# Patient Record
Sex: MALE | Race: BLACK OR AFRICAN AMERICAN | Employment: FULL TIME | URBAN - METROPOLITAN AREA
[De-identification: names, ages, dates, MRNs, and addresses within clinical notes are randomized per-mention and may not be internally consistent; named-entity substitution may affect disease eponyms.]

---

## 2017-02-17 ENCOUNTER — HOSPITAL ENCOUNTER (EMERGENCY)
Facility: CLINIC | Age: 42
Discharge: HOME | End: 2017-02-17
Attending: EMERGENCY MEDICINE | Admitting: EMERGENCY MEDICINE
Payer: MEDICAID

## 2017-02-17 ENCOUNTER — APPOINTMENT (OUTPATIENT)
Dept: GENERAL RADIOLOGY | Facility: CLINIC | Age: 42
End: 2017-02-17
Attending: EMERGENCY MEDICINE
Payer: MEDICAID

## 2017-02-17 VITALS
TEMPERATURE: 99.2 F | SYSTOLIC BLOOD PRESSURE: 100 MMHG | OXYGEN SATURATION: 96 % | RESPIRATION RATE: 16 BRPM | DIASTOLIC BLOOD PRESSURE: 62 MMHG | HEART RATE: 78 BPM

## 2017-02-17 DIAGNOSIS — G40.909 NONINTRACTABLE EPILEPSY WITHOUT STATUS EPILEPTICUS, UNSPECIFIED EPILEPSY TYPE (H): ICD-10-CM

## 2017-02-17 DIAGNOSIS — S09.90XA HEAD INJURY, INITIAL ENCOUNTER: ICD-10-CM
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
## 2017-02-17 LAB
ANION GAP SERPL CALCULATED.3IONS-SCNC: 8 MMOL/L (ref 3–14)
BASOPHILS # BLD AUTO: 0 10E9/L (ref 0–0.2)
BASOPHILS NFR BLD AUTO: 0.2 %
BUN SERPL-MCNC: 10 MG/DL (ref 7–30)
CALCIUM SERPL-MCNC: 9.1 MG/DL (ref 8.5–10.1)
CHLORIDE SERPL-SCNC: 101 MMOL/L (ref 94–109)
CO2 SERPL-SCNC: 29 MMOL/L (ref 20–32)
CREAT SERPL-MCNC: 0.93 MG/DL (ref 0.66–1.25)
DIFFERENTIAL METHOD BLD: ABNORMAL
EOSINOPHIL # BLD AUTO: 0.1 10E9/L (ref 0–0.7)
EOSINOPHIL NFR BLD AUTO: 1.7 %
ERYTHROCYTE [DISTWIDTH] IN BLOOD BY AUTOMATED COUNT: 15.3 % (ref 10–15)
GFR SERPL CREATININE-BSD FRML MDRD: 88 ML/MIN/1.7M2
GLUCOSE SERPL-MCNC: 87 MG/DL (ref 70–99)
HCT VFR BLD AUTO: 39.7 % (ref 40–53)
HGB BLD-MCNC: 13.4 G/DL (ref 13.3–17.7)
IMM GRANULOCYTES # BLD: 0 10E9/L (ref 0–0.4)
IMM GRANULOCYTES NFR BLD: 0.2 %
INTERPRETATION ECG - MUSE: NORMAL
LYMPHOCYTES # BLD AUTO: 1.2 10E9/L (ref 0.8–5.3)
LYMPHOCYTES NFR BLD AUTO: 14.5 %
MCH RBC QN AUTO: 26.7 PG (ref 26.5–33)
MCHC RBC AUTO-ENTMCNC: 33.8 G/DL (ref 31.5–36.5)
MCV RBC AUTO: 79 FL (ref 78–100)
MONOCYTES # BLD AUTO: 0.5 10E9/L (ref 0–1.3)
MONOCYTES NFR BLD AUTO: 5.7 %
NEUTROPHILS # BLD AUTO: 6.3 10E9/L (ref 1.6–8.3)
NEUTROPHILS NFR BLD AUTO: 77.7 %
NRBC # BLD AUTO: 0 10*3/UL
NRBC BLD AUTO-RTO: 0 /100
PLATELET # BLD AUTO: 248 10E9/L (ref 150–450)
POTASSIUM SERPL-SCNC: 4.3 MMOL/L (ref 3.4–5.3)
RBC # BLD AUTO: 5.01 10E12/L (ref 4.4–5.9)
SODIUM SERPL-SCNC: 138 MMOL/L (ref 133–144)
WBC # BLD AUTO: 8.2 10E9/L (ref 4–11)

## 2017-02-17 PROCEDURE — 93005 ELECTROCARDIOGRAM TRACING: CPT

## 2017-02-17 PROCEDURE — 80175 DRUG SCREEN QUAN LAMOTRIGINE: CPT | Performed by: EMERGENCY MEDICINE

## 2017-02-17 PROCEDURE — 72040 X-RAY EXAM NECK SPINE 2-3 VW: CPT

## 2017-02-17 PROCEDURE — 99285 EMERGENCY DEPT VISIT HI MDM: CPT

## 2017-02-17 PROCEDURE — 80048 BASIC METABOLIC PNL TOTAL CA: CPT | Performed by: EMERGENCY MEDICINE

## 2017-02-17 PROCEDURE — 85025 COMPLETE CBC W/AUTO DIFF WBC: CPT | Performed by: EMERGENCY MEDICINE

## 2017-02-17 ASSESSMENT — ENCOUNTER SYMPTOMS
NECK PAIN: 1
WOUND: 1
SEIZURES: 1

## 2017-02-17 NOTE — ED AVS SNAPSHOT
Emergency Department    [address] [facility] [address]    Phone:  [phone]    Fax:  [phone]                                       Sharona Man   MRN: [id number]    Department:   Emergency Department   Date of Visit:  2/17/2017           Patient Information     Date Of Birth          1975        Your diagnoses for this visit were:     Nonintractable epilepsy without status epilepticus, unspecified epilepsy type (H)     Fall, initial encounter     Head injury, initial encounter        You were seen by Anil Christian DO.      Follow-up Information     Follow up with Rosa Hinds In 3 days.    Specialty:  Internal Medicine    Why:  As needed    Contact information:    [facility]  [address] [facility] [address]  [phone]          Follow up with Laury Santana MD.    Why:  as scheduled    Contact information:    [facility]  [address]  [facility] [address]  [phone]          Follow up with  Emergency Department.    Specialty:  EMERGENCY MEDICINE    Why:  If symptoms worsen    Contact information:    [address] [facility] [id number] [phone]        Discharge Instructions       Return to the emergency department or seek medical care as instructed if your symptoms fail to improve or significantly worsen.    Take Tylenol as needed for symptom/pain relief; use as directed.    Ice area of pain for 20 minutes four times per day for the next two days    Follow-up as indicated on page 1.  Maintain adequate hydration and get plenty of rest.      Discharge References/Attachments     HEAD INJURY, NO WAKE-UP (ADULT) (ENGLISH)      24 Hour Appointment Hotline       To make an appointment at any [facility], call [phone] ([phone]). If you don't have a family doctor or clinic, we will help you find one. [facility] are conveniently located to serve the needs of you and your family.              Review of your medicines      Our records show that you are taking the medicines listed below. If these are incorrect, please call your family doctor or clinic.        Dose / Directions Last dose taken    ATRAC-TAIN 10 % cream   Generic drug:  urea        Apply  topically 2 times daily.   Refills:  0        bacitracin ophthalmic ointment   Dose:  1 Application        1 Application as needed   Refills:  0        BENZTROPINE MESYLATE PO   Dose:  1 mg        Take 1 mg by mouth 2 times daily   Refills:  0        calamine lotion        Apply topically as needed for itching   Refills:  0        CHLORASEPTIC SORE THROAT PO        Take by mouth as needed   Refills:  0        CLINPRO 5000 1.1 % Pste   Dose:  1 g   Generic drug:  Sodium Fluoride        Apply 1 g to affected area every morning   Refills:  0        CLONAZEPAM PO   Dose:  0.5 mg   Indication:  Seizures with Sudden Loss of Muscle Tone        Take 0.5 mg by mouth 2 times daily   Refills:  0        CLOZAPINE PO   Commonly known as:  CLOZARIL   Dose:  100 mg   Indication:  Symptoms of autism spectrum disorder        Take 100 mg by mouth daily   Refills:  0        famotidine 20 MG tablet   Commonly known as:  PEPCID   Dose:  20 mg        Take 20 mg by mouth At Bedtime.   Refills:  0        fish oil-omega-3 fatty acids 1000 MG capsule   Dose:  2 g        Take 2 g by mouth 2 times daily.   Refills:  0        FLUoxetine 40 MG capsule   Commonly known as:  PROzac   Dose:  40 mg        Take 40 mg by mouth 2 times daily.   Refills:  0        ibuprofen 200 MG tablet   Commonly known as:  ADVIL/MOTRIN   Dose:  200 mg        Take 200 mg by mouth every 4 hours as needed for mild pain   Refills:  0        IMODIUM MULTI-SYMPTOM RELIEF 2-125 MG Chew   Generic drug:  Loperamide-Simethicone        Take by mouth as needed   Refills:  0        lamoTRIgine 200 MG tablet   Commonly known as:  LaMICtal   Quantity:  135 tablet        Take 1.5 tablets (300mg) by mouth three times a day.    Refills:  5        LORazepam 2 MG/ML (HIGH CONC) solution   Commonly known as:  LORazepam INTENSOL   Quantity:  60 mL        Give 1 ml (2mg) between cheek and gums for any seizure > 3 min or 2 seizures in 1 h or 3 seizures in 24 h. No more than 2 doses in 24 hours.   Refills:  0        MILK OF MAGNESIA PO   Dose:  2 teaspoonful        Take 2 teaspoonful by mouth as needed   Refills:  0        MYLANTA PO   Dose:  2 teaspoonful        Take 2 teaspoonful by mouth as needed   Refills:  0        NEUTROGENA T/GEL 0.5 % Sham   Generic drug:  Coal Tar Extract        Refills:  0        OLANZAPINE PO   Dose:  5 tablet        Take 5 tablets by mouth 2 times daily   Refills:  0        ROBITUSSIN COLD COUGH+ CHEST PO        Take by mouth as needed   Refills:  0        rufinamide 400 MG tablet   Commonly known as:  BANZEL   Quantity:  450 tablet        Increase as instructed to 7 tabs (2800mg) orally every morning, and 8 tabs (3200mg) orally every evening.   Refills:  11        senna-docusate 8.6-50 MG per tablet   Commonly known as:  SENOKOT-S;PERICOLACE   Dose:  2 tablet        Take 2 tablets by mouth 2 times daily   Refills:  0        triamcinolone 0.1 % ointment   Commonly known as:  KENALOG   Dose:  0.5 inch        Apply 0.5 inches topically 2 times daily.   Refills:  0        TUMS PO        Take by mouth as needed   Refills:  0                Procedures and tests performed during your visit     Basic metabolic panel    CBC with platelets differential    Cervical spine XR, 2-3 views    EKG 12-lead, tracing only    Lamotrigine level      Orders Needing Specimen Collection     None      Pending Results     Date and Time Order Name Status Description    2/17/2017 1756 Cervical spine XR, 2-3 views Preliminary     2/17/2017 1756 Lamotrigine level In process             Pending Culture Results     No orders found from 2/15/2017 to 2/18/2017.             Test Results from your hospital stay     2/17/2017  6:26 PM - Interface,  Flexilab Results      Component Results     Component Value Ref Range & Units Status    WBC 8.2 4.0 - 11.0 10e9/L Final    RBC Count 5.01 4.4 - 5.9 10e12/L Final    Hemoglobin 13.4 13.3 - 17.7 g/dL Final    Hematocrit 39.7 (L) 40.0 - 53.0 % Final    MCV 79 78 - 100 fl Final    MCH 26.7 26.5 - 33.0 pg Final    MCHC 33.8 31.5 - 36.5 g/dL Final    RDW 15.3 (H) 10.0 - 15.0 % Final    Platelet Count 248 150 - 450 10e9/L Final    Diff Method Automated Method  Final    % Neutrophils 77.7 % Final    % Lymphocytes 14.5 % Final    % Monocytes 5.7 % Final    % Eosinophils 1.7 % Final    % Basophils 0.2 % Final    % Immature Granulocytes 0.2 % Final    Nucleated RBCs 0 0 /100 Final    Absolute Neutrophil 6.3 1.6 - 8.3 10e9/L Final    Absolute Lymphocytes 1.2 0.8 - 5.3 10e9/L Final    Absolute Monocytes 0.5 0.0 - 1.3 10e9/L Final    Absolute Eosinophils 0.1 0.0 - 0.7 10e9/L Final    Absolute Basophils 0.0 0.0 - 0.2 10e9/L Final    Abs Immature Granulocytes 0.0 0 - 0.4 10e9/L Final    Absolute Nucleated RBC 0.0  Final         2/17/2017  6:29 PM - Interface, Flexilab Results      Component Results     Component Value Ref Range & Units Status    Sodium 138 133 - 144 mmol/L Final    Potassium 4.3 3.4 - 5.3 mmol/L Final    Chloride 101 94 - 109 mmol/L Final    Carbon Dioxide 29 20 - 32 mmol/L Final    Anion Gap 8 3 - 14 mmol/L Final    Glucose 87 70 - 99 mg/dL Final    Urea Nitrogen 10 7 - 30 mg/dL Final    Creatinine 0.93 0.66 - 1.25 mg/dL Final    GFR Estimate 88 >60 mL/min/1.7m2 Final    Non  GFR Calc    GFR Estimate If Black >90   GFR Calc   >60 mL/min/1.7m2 Final    Calcium 9.1 8.5 - 10.1 mg/dL Final         2/17/2017  6:09 PM - Interface, Flexilab Results         2/17/2017  6:51 PM - Interface, Radiant Ib      Narrative     XR CERVICAL SPINE 2/3 VWS2/17/2017 6:33 PM     HISTORY:  fall, neck pain r/o fracture    COMPARISON: None.    FINDINGS: Straightening of the cervical lordosis. No fractures  are  identified.  Anterior osteophytes are seen at multiple levels. Loss of  disc space height throughout the cervical spine.        Impression     IMPRESSION:     1. No fractures.  2. Multilevel degenerative change.                Clinical Quality Measure: Blood Pressure Screening     Your blood pressure was checked while you were in the emergency department today. The last reading we obtained was  BP: 114/71 . Please read the guidelines below about what these numbers mean and what you should do about them.  If your systolic blood pressure (the top number) is less than 120 and your diastolic blood pressure (the bottom number) is less than 80, then your blood pressure is normal. There is nothing more that you need to do about it.  If your systolic blood pressure (the top number) is 120-139 or your diastolic blood pressure (the bottom number) is 80-89, your blood pressure may be higher than it should be. You should have your blood pressure rechecked within a year by a primary care provider.  If your systolic blood pressure (the top number) is 140 or greater or your diastolic blood pressure (the bottom number) is 90 or greater, you may have high blood pressure. High blood pressure is treatable, but if left untreated over time it can put you at risk for heart attack, stroke, or kidney failure. You should have your blood pressure rechecked by a primary care provider within the next 4 weeks.  If your provider in the emergency department today gave you specific instructions to follow-up with your doctor or provider even sooner than that, you should follow that instruction and not wait for up to 4 weeks for your follow-up visit.        Thank you for choosing [facility]       Thank you for choosing [facility] for your care. Our goal is always to provide you with excellent care. Hearing back from our patients is one way we can continue to improve our services. Please take a few minutes to complete the written survey that you may  "receive in the mail after you visit with us. Thank you!        [employer] Information     [employer] CRM lets you send messages to your doctor, view your test results, renew your prescriptions, schedule appointments and more. To sign up, go to [email or web address] . Click on \"Log in\" on the left side of the screen, which will take you to the Welcome page. Then click on \"Sign up Now\" on the right side of the page.     You will be asked to enter the access code listed below, as well as some personal information. Please follow the directions to create your username and password.     Your access code is: [email or web address]  Expires: 2017  4:41 PM     Your access code will  in 90 days. If you need help or a new code, please call your [facility] clinic or [phone].        Care EveryWhere ID     This is your Care EveryWhere ID. This could be used by other organizations to access your [facility] medical records  [id number]        After Visit Summary       This is your record. Keep this with you and show to your community pharmacist(s) and doctor(s) at your next visit.                  "

## 2017-02-17 NOTE — ED AVS SNAPSHOT
Emergency Department    [address] [place] [address]    Phone:  [phone]    Fax:  [phone]                                       Sharona Man   MRN: [id number]    Department:   Emergency Department   Date of Visit:  2/17/2017           After Visit Summary Signature Page     I have received my discharge instructions, and my questions have been answered. I have discussed any challenges I see with this plan with the nurse or doctor.    ..........................................................................................................................................  Patient/Patient Representative Signature      ..........................................................................................................................................  Patient Representative Print Name and Relationship to Patient    ..................................................               ................................................  Date                                            Time    ..........................................................................................................................................  Reviewed by Signature/Title    ...................................................              ..............................................  Date                                                            Time

## 2017-02-17 NOTE — ED PROVIDER NOTES
History     Chief Complaint:  Seizure, head injury    HPI   Sharona Man is a 41 year old male with mental retardation and generalized epilepsy who presents to the Emergency Department with c-collar in place for evaluation of seizure. Pt had seizure while playing basketball, fell backwards, and hit head on tree roots; small hematoma to occipital scalp. C-collar placed in triage. Patient is scheduled to see Dr. Santana with [facility] next week. The patient is here with his  Maged. Maged states the patient was outside playing basketball when he witnessed the patient having a seizure as well as hitting his head on the ground. Upon presentation the patient complains of neck pain.  states the patient's behavior is at baseline. He notes the patients last seizure about one week ago.  denies any vomiting, slurred speech or any other concerning symptoms from the patient.     Allergies:  No known drug allergies    Medications:    CLONAZEPAM PO  CLOZAPINE PO (CLOZARIL)  LORazepam (LORAZEPAM INTENSOL) 2 MG/ML concentrated solution  lamoTRIgine (LAMICTAL) 200 MG tablet  rufinamide (BANZEL) 400 MG tablet  OLANZAPINE PO  Coal Tar Extract (NEUTROGENA T/GEL) 0.5 % SHAM  bacitracin ophthalmic ointment  calamine lotion  Acetaminophen (CHLORASEPTIC SORE THROAT PO)  Dextromethorphan-Guaifenesin (ROBITUSSIN COLD COUGH+ CHEST PO)  Calcium Carbonate Antacid (TUMS PO)  BENZTROPINE MESYLATE PO  Sodium Fluoride (CLINPRO 5000) 1.1 % PSTE  senna-docusate (SENOKOT-S;PERICOLACE) 8.6-50 MG per tablet  ibuprofen (ADVIL,MOTRIN) 200 MG tablet  Loperamide-Simethicone (IMODIUM MULTI-SYMPTOM RELIEF) 2-125 MG CHEW  Magnesium Hydroxide (MILK OF MAGNESIA PO)  Calcium & Magnesium Carbonates (MYLANTA PO)  fish oil-omega-3 fatty acids (OMEGA 3) 1000 MG capsule  FLUoxetine (PROZAC) 40 MG capsule  famotidine (PEPCID) 20 MG tablet  urea (ATRAC-TAIN) 10 % cream  triamcinolone (KENALOG)  0.1 % ointment     Past Medical History:    Altered mental status  KELLY   Symptomatic generalized epilepsy  Autism  Dry skin    Past Surgical History:    The patient does not have any pertinent past surgical history.    Family History:    No past pertinent family history.    Social History:  The patient was accompanied to the ED by .  Smoking Status: no  Smokeless Tobacco: no  Alcohol Use: no  Marital Status:  Single [1]     Review of Systems   Musculoskeletal: Positive for neck pain.   Skin: Positive for wound.   Neurological: Positive for seizures.   All other systems reviewed and are negative.    Physical Exam   First Vitals:  BP: 114/58  Pulse: 90  Temp: 98.6  F (37  C)  Resp: 16  SpO2: 98 %      Physical Exam  General: Alert and cooperative with exam. Patient in no acute distress. Baseline mentation. Listening to headphones  Head:  Small hematoma to posterior scalp; tender to palpation  Eyes:  No scleral icterus, PERRL, EOMI   ENT:  The external nose and ears are normal. The oropharynx is normal and without erythema; mucus membranes are moist.  Neck:  Normal range of motion without rigidity.   CV:  Regular rate and rhythm    No pathologic murmur   Resp:  Breath sounds are clear bilaterally    Non-labored, no retractions or accessory muscle use  GI:  Abdomen is soft, no distension, no tenderness.   MS:  No lower extremity edema     Mild midline cervical tenderness without step off  Skin:  Warm and dry, No rash or lesions noted.  Neuro: Oriented x 3. No gross motor deficits.    Strength and sensation grossly intact in all 4 extremities.      Cranial nerves 2-12 intact.            Emergency Department Course   ECG:  Indication: Seizure  Vent. Rate 78 bpm. AZ interval 146. QRS duration 84. QT/QTc 366/417. P-R-T axis 55 19 21.   Normal sinus rhyth. Possible left atrial enlargement. Borderline ECG. Read time: 1828.    Imaging:  Radiographic findings were communicated with the patient who voiced  understanding of the findings.    Cervical spine XR  1. No fractures.  2. Multilevel degenerative change. As per radiology.     Laboratory:  CBC: WBC: 8.2, HGB: 13.4, PLT: 248  BMP: o/w WNL (Creat 0.93)  Lamotrigine level: Pending    Emergency Department Course:  Nursing notes and vitals reviewed. I performed an exam of the patient as documented above.     The patient was sent for a cervical spine XR while here in the emergency department, findings above.    Blood drawn. This was sent to the lab for further testing, results above.    EKG obtained in the ED, see results above.     I reassessed the patient.     Findings and plan explained to the Patient. Patient discharged home with instructions regarding supportive care, medications, and reasons to return. The importance of close follow-up was reviewed. The patient was prescribed     Impression & Plan    Medical Decision Making:  Sharona Man is a 41 year old male with a history of epilepsy who presents after witnessed seizure and associated fall and head injury; patient with intellectual disability and is not reliable historian. Patients medical history and records were reviewed. Patients neurologic exam is at baseline, at this time there is no emergent indication for advanced head imaging per [place] head CT rules. Labs were obtained and unremarkable as noted above; lamotrigine level pending. Given the patients complaint of mild neck pain a cervical xray was obtained and unremarkable. Patient was observed in the ED for two hours with no further seizure episodes and remained at neurologic baseline. Patients care was discussed with group home staff who was present in the room and return precautions were communicated both in person and in writing. EKG without evidence of arrhythmia or other significant abnormalities. Patient discharged back to group home with follow up with Neurologist as scheduled next week. At the time of discharge the patient was at  neurologic baseline, hemodynamically stable, and without complaint.     Diagnosis:    ICD-10-CM    1. Nonintractable epilepsy without status epilepticus, unspecified epilepsy type (H) G40.909    2. Fall, initial encounter W19.XXXA    3. Head injury, initial encounter S09.90XA      Disposition:  discharged to home    Discharge Medications:  New Prescriptions    No medications on file       I, Radha Prabhakar, am serving as a scribe on 2/17/2017 at 5:39 PM to personally document services performed by Anil Christian DO based on my observations and the provider's statements to me.     Radha Prabhakar  2/17/2017    EMERGENCY DEPARTMENT       Anil Christian DO  02/18/17 1557

## 2017-02-18 NOTE — DISCHARGE INSTRUCTIONS
Return to the emergency department or seek medical care as instructed if your symptoms fail to improve or significantly worsen.    Take Tylenol as needed for symptom/pain relief; use as directed.    Ice area of pain for 20 minutes four times per day for the next two days    Follow-up as indicated on page 1.  Maintain adequate hydration and get plenty of rest.

## 2017-02-19 LAB — LAMOTRIGINE SERPL-MCNC: 15.2 UG/ML

## 2017-02-22 ENCOUNTER — HOSPITAL ENCOUNTER (EMERGENCY)
Facility: CLINIC | Age: 42
Discharge: HOME | End: 2017-02-22
Attending: EMERGENCY MEDICINE | Admitting: EMERGENCY MEDICINE
Payer: MEDICAID

## 2017-02-22 ENCOUNTER — APPOINTMENT (OUTPATIENT)
Dept: CT IMAGING | Facility: CLINIC | Age: 42
End: 2017-02-22
Attending: EMERGENCY MEDICINE
Payer: MEDICAID

## 2017-02-22 ENCOUNTER — APPOINTMENT (OUTPATIENT)
Dept: GENERAL RADIOLOGY | Facility: CLINIC | Age: 42
End: 2017-02-22
Attending: EMERGENCY MEDICINE
Payer: MEDICAID

## 2017-02-22 VITALS
TEMPERATURE: 98.9 F | DIASTOLIC BLOOD PRESSURE: 71 MMHG | SYSTOLIC BLOOD PRESSURE: 114 MMHG | HEART RATE: 76 BPM | OXYGEN SATURATION: 95 %

## 2017-02-22 DIAGNOSIS — S01.01XA LACERATION OF SCALP, INITIAL ENCOUNTER: ICD-10-CM

## 2017-02-22 DIAGNOSIS — S16.1XXA CERVICAL STRAIN, INITIAL ENCOUNTER: ICD-10-CM

## 2017-02-22 DIAGNOSIS — G40.909 NONINTRACTABLE EPILEPSY WITHOUT STATUS EPILEPTICUS, UNSPECIFIED EPILEPSY TYPE (H): ICD-10-CM

## 2017-02-22 PROCEDURE — 12001 RPR S/N/AX/GEN/TRNK 2.5CM/<: CPT

## 2017-02-22 PROCEDURE — 99284 EMERGENCY DEPT VISIT MOD MDM: CPT | Mod: 25

## 2017-02-22 PROCEDURE — 70450 CT HEAD/BRAIN W/O DYE: CPT

## 2017-02-22 PROCEDURE — 72040 X-RAY EXAM NECK SPINE 2-3 VW: CPT

## 2017-02-22 NOTE — ED AVS SNAPSHOT
Emergency Department    [address] [place] [address]    Phone:  [phone]    Fax:  [phone]                                       Sharona Man   MRN: [id number]    Department:   Emergency Department   Date of Visit:  2/22/2017           After Visit Summary Signature Page     I have received my discharge instructions, and my questions have been answered. I have discussed any challenges I see with this plan with the nurse or doctor.    ..........................................................................................................................................  Patient/Patient Representative Signature      ..........................................................................................................................................  Patient Representative Print Name and Relationship to Patient    ..................................................               ................................................  Date                                            Time    ..........................................................................................................................................  Reviewed by Signature/Title    ...................................................              ..............................................  Date                                                            Time

## 2017-02-22 NOTE — ED AVS SNAPSHOT
Emergency Department    [address] [facility] [address]    Phone:  [phone]    Fax:  [phone]                                       Sharona Man   MRN: [id number]    Department:   Emergency Department   Date of Visit:  2/22/2017           Patient Information     Date Of Birth          1975        Your diagnoses for this visit were:     Nonintractable epilepsy without status epilepticus, unspecified epilepsy type (H)     2.5 cm Laceration of scalp, initial encounter 8 staples    Cervical strain, initial encounter        You were seen by Candido Yoo MD.      Follow-up Information     Schedule an appointment as soon as possible for a visit to follow up.    Why:  with your neurologist        Follow up with  Emergency Department.    Specialty:  EMERGENCY MEDICINE    Why:  For suture removal in 7 days    Contact information:    [address] [facility] [address] [phone]        Follow up with  Emergency Department.    Specialty:  EMERGENCY MEDICINE    Why:  If symptoms worsen    Contact information:    [address] [facility] [address] [phone]        Discharge Instructions       Discharge Instructions  Laceration (Cut)    You were seen today for a laceration (cut).  Your doctor examined your laceration for any problems such a buried foreign body (like glass, a splinter, or gravel), or injury to blood vessels, tendons, and nerves.  Your doctor may have also rinsed and/or scrubbed your laceration to help prevent an infection.  Your laceration may have been closed with glue, staples or sutures (stitches).      It may not be possible to find all problems with your laceration on the first visit, and we can't always prevent infections.  Antibiotics are only given when the benefit is more than the risk, and don't prevent all infections. Some lacerations are too high risk to close, and are left open to heal.  All  lacerations, no matter how expertly repaired, will cause scarring.    Return to the Emergency Department right away if:    You have more redness, swelling, pain, drainage (pus), a bad smell, or red streaking from your laceration.      You have a fever of 101oF or more.    You have bleeding that you can t stop at home. If your cut starts to bleed, hold pressure on the bleeding area with a clean cloth or put pressure over the bandage.  If the bleeding doesn t stop after using constant pressure for 30 minutes, you should return to the Emergency Department for further treatment.    An area past the laceration is cool, pale, or blue compared with the other side, or has a slower return of color when squeezed.    Your dressing seems too tight or starts to get uncomfortable or painful.    You have loss of normal function or use of an area, such as being unable to straighten or bend a finger normally.    You have a numb area past the laceration.    Return to the Emergency Department or see your regular doctor if:    The laceration starts to come open.     You have something coming out of the cut or a feeling that there is something in the laceration.    Your wound will not heal, or keeps breaking open. There can always be glass, wood, dirt or other things in any wound.  They won t always show up, even on x-rays.  If a wound doesn t heal, this may be why, and it is important to follow-up with your regular doctor.    Home Care:    Take your dressing off in 12 hours, or as instructed by your doctor, to check your laceration. Remove the dressing sooner if it seems too tight or painful, or if it is getting numb, tingly, or pale past the dressing.    Gently wash your laceration 2 times a day with clean cloth and soap.     It is okay to shower, but do not let the laceration soak in water.      If your laceration was closed with wound adhesive or strips: pat it dry and leave it open to the air.     For all other repairs: after you  "wash your laceration, or at least 2 times a day, apply bacitracin or other antibiotic ointment to the laceration, then cover it with a Band-Aid  or gauze.    Keep the laceration clean. Wear gloves or other protective clothing if you are around dirt.    Follow-up:    You need to follow-up with your neurologist as soon as possible.    Your sutures or staples need to be removed in about 7 days. Return here or schedule an appointment with your regular doctor to have this done.    Scars:  To help minimize scarring:    Wear sunscreen over the healed laceration when out in the sun.    Massage the area regularly.    You may use Vitamin E oil.    Wait a year.  Most scars will start to fade within a year.    Probiotics: If you have been given an antibiotic, you may want to also take a probiotic pill or eat yogurt with live cultures. Probiotics have \"good bacteria\" to help your intestines stay healthy. Studies have shown that probiotics help prevent diarrhea and other intestine problems (including C. diff infection) when you take antibiotics. You can buy these without a prescription in the pharmacy section of the store.     If you were given a prescription for medicine here today, be sure to read all of the information (including the package insert) that comes with your prescription.  This will include important information about the medicine, its side effects, and any warnings that you need to know about.  The pharmacist who fills the prescription can provide more information and answer questions you may have about the medicine.  If you have questions or concerns that the pharmacist cannot address, please call or return to the Emergency Department.     Opioid Medication Information    Pain medications are among the most commonly prescribed medicines, so we are including this information for all our patients. If you did not receive pain medication or get a prescription for pain medicine, you can ignore it.     You may have " been given a prescription for an opioid (narcotic) pain medicine and/or have received a pain medicine while here in the Emergency Department. These medicines can make you drowsy or impaired. You must not drive, operate dangerous equipment, or engage in any other dangerous activities while taking these medications. If you drive while taking these medications, you could be arrested for DUI, or driving under the influence. Do not drink any alcohol while you are taking these medications.     Opioid pain medications can cause addiction. If you have a history of chemical dependency of any type, you are at a higher risk of becoming addicted to pain medications.  Only take these prescribed medications to treat your pain when all other options have been tried. Take it for as short a time and as few doses as possible. Store your pain pills in a secure place, as they are frequently stolen and provide a dangerous opportunity for children or visitors in your house to start abusing these powerful medications. We will not replace any lost or stolen medicine.  As soon as your pain is better, you should flush all your remaining medication.     Many prescription pain medications contain Tylenol  (acetaminophen), including Vicodin , Tylenol #3 , Norco , Lortab , and Percocet .  You should not take any extra pills of Tylenol  if you are using these prescription medications or you can get very sick.  Do not ever take more than 3000 mg of acetaminophen in any 24 hour period.    All opioids tend to cause constipation. Drink plenty of water and eat foods that have a lot of fiber, such as fruits, vegetables, prune juice, apple juice and high fiber cereal.  Take a laxative if you don t move your bowels at least every other day. Miralax , Milk of Magnesia, Colace , or Senna  can be used to keep you regular.      Remember that you can always come back to the Emergency Department if you are not able to see your regular doctor in the amount of  time listed above, if you get any new symptoms, or if there is anything that worries you.      Discharge Instructions  Recurrent Seizure (Convulsion)    You were seen today for a seizure. The most common reason for a recurrent seizure is having missed a dose of your medication or taken it at a different time than normal. Other things that increase the risk of seizures include fever, sleep deprivation, alcohol, and stress. Although anti-seizure medications (anti-epileptic drugs) work for many people with seizure disorders, some people continue to have seizures even after trying several medications.    You need to follow-up with your doctor within 1-2 days.    Return to the Emergency Department if:     You develop a fever over 101.    You feel much more ill, or develop new symptoms like severe headache.    You have severe nausea or vomiting.    You have trouble walking, seeing, or develop weakness or numbness in your arms or legs.     What can I do to help myself?    Take your medication exactly as directed, at the right times, and the right doses.     If you develop uncomfortable side effects, don't stop taking your anti-seizure medication without first speaking to your physician.     Do not let your prescription run out. Stopping anti-seizure medication abruptly can put your at risk of a seizure.    While taking an anti-seizure medication, do not start taking any other medications including over-the-counter medications and herbal supplements without first checking with your physician because mixing them can be dangerous.    Do not drive until you have been rechecked by your doctor and have been told it is safe to drive.  If you have a seizure while driving you may cause a motor vehicle accident with injury or death to yourself or others.     Do not swim, climb ladders, or do anything else that would be dangerous if you had another seizure or spell of loss of consciousness, until you are cleared by your doctor.       Check your state driving requirements for patients with seizures on the Epilepsy Foundation Website at www.epilepsyfoundation.org/resources/drivingandtravel.cfm.    Do not drink alcohol.  Drinking alcohol increases the risk of seizures and can interfere with the effect of anti-seizure medications.    Start a seizure calendar to record any seizure triggers, such as days when you were sleep-deprived, stressed, drank alcohol, or (if you are a woman) had your period.    Remember, if one medication does not work for you, either because you cannot tolerate the side effects or because you continue to have seizures, your physician can suggest alternate medications or alternate methods of taking the medication.  If you were given a prescription for medicine here today, be sure to read all of the information (including the package insert) that comes with your prescription.  This will include important information about the medicine, its side effects, and any warnings that you need to know about.  The pharmacist who fills the prescription can provide more information and answer questions you may have about the medicine.  If you have questions or concerns that the pharmacist cannot address, please call or return to the Emergency Department.   Opioid Medication Information    Pain medications are among the most commonly prescribed medicines, so we are including this information for all our patients. If you did not receive pain medication or get a prescription for pain medicine, you can ignore it.     You may have been given a prescription for an opioid (narcotic) pain medicine and/or have received a pain medicine while here in the Emergency Department. These medicines can make you drowsy or impaired. You must not drive, operate dangerous equipment, or engage in any other dangerous activities while taking these medications. If you drive while taking these medications, you could be arrested for DUI, or driving under the  influence. Do not drink any alcohol while you are taking these medications.     Opioid pain medications can cause addiction. If you have a history of chemical dependency of any type, you are at a higher risk of becoming addicted to pain medications.  Only take these prescribed medications to treat your pain when all other options have been tried. Take it for as short a time and as few doses as possible. Store your pain pills in a secure place, as they are frequently stolen and provide a dangerous opportunity for children or visitors in your house to start abusing these powerful medications. We will not replace any lost or stolen medicine.  As soon as your pain is better, you should flush all your remaining medication.     Many prescription pain medications contain Tylenol  (acetaminophen), including Vicodin , Tylenol #3 , Norco , Lortab , and Percocet .  You should not take any extra pills of Tylenol  if you are using these prescription medications or you can get very sick.  Do not ever take more than 3000 mg of acetaminophen in any 24 hour period.    All opioids tend to cause constipation. Drink plenty of water and eat foods that have a lot of fiber, such as fruits, vegetables, prune juice, apple juice and high fiber cereal.  Take a laxative if you don t move your bowels at least every other day. Miralax , Milk of Magnesia, Colace , or Senna  can be used to keep you regular.      Remember that you can always come back to the Emergency Department if you are not able to see your regular doctor in the amount of time listed above, if you get any new symptoms, or if there is anything that worries you.        Discharge References/Attachments     NECK SPRAIN OR STRAIN (ENGLISH)      24 Hour Appointment Hotline       To make an appointment at any [facility], call [phone] ([phone]). If you don't have a family doctor or clinic, we will help you find one. [facility] are conveniently located to serve  the needs of you and your family.             Review of your medicines      Our records show that you are taking the medicines listed below. If these are incorrect, please call your family doctor or clinic.        Dose / Directions Last dose taken    ATRAC-TAIN 10 % cream   Generic drug:  urea        Apply  topically 2 times daily.   Refills:  0        bacitracin ophthalmic ointment   Dose:  1 Application        1 Application as needed   Refills:  0        BENZTROPINE MESYLATE PO   Dose:  1 mg        Take 1 mg by mouth 2 times daily   Refills:  0        calamine lotion        Apply topically as needed for itching   Refills:  0        CHLORASEPTIC SORE THROAT PO        Take by mouth as needed   Refills:  0        CLINPRO 5000 1.1 % Pste   Dose:  1 g   Generic drug:  Sodium Fluoride        Apply 1 g to affected area every morning   Refills:  0        CLONAZEPAM PO   Dose:  0.5 mg   Indication:  Seizures with Sudden Loss of Muscle Tone        Take 0.5 mg by mouth 2 times daily   Refills:  0        CLOZAPINE PO   Commonly known as:  CLOZARIL   Dose:  100 mg   Indication:  Symptoms of autism spectrum disorder        Take 100 mg by mouth daily   Refills:  0        famotidine 20 MG tablet   Commonly known as:  PEPCID   Dose:  20 mg        Take 20 mg by mouth At Bedtime.   Refills:  0        fish oil-omega-3 fatty acids 1000 MG capsule   Dose:  2 g        Take 2 g by mouth 2 times daily.   Refills:  0        FLUoxetine 40 MG capsule   Commonly known as:  PROzac   Dose:  40 mg        Take 40 mg by mouth 2 times daily.   Refills:  0        ibuprofen 200 MG tablet   Commonly known as:  ADVIL/MOTRIN   Dose:  200 mg        Take 200 mg by mouth every 4 hours as needed for mild pain   Refills:  0        IMODIUM MULTI-SYMPTOM RELIEF 2-125 MG Chew   Generic drug:  Loperamide-Simethicone        Take by mouth as needed   Refills:  0        lamoTRIgine 200 MG tablet   Commonly known as:  LaMICtal   Quantity:  135 tablet        Take 1.5  tablets (300mg) by mouth three times a day.   Refills:  5        LORazepam 2 MG/ML (HIGH CONC) solution   Commonly known as:  LORazepam INTENSOL   Quantity:  60 mL        Give 1 ml (2mg) between cheek and gums for any seizure > 3 min or 2 seizures in 1 h or 3 seizures in 24 h. No more than 2 doses in 24 hours.   Refills:  0        MILK OF MAGNESIA PO   Dose:  2 teaspoonful        Take 2 teaspoonful by mouth as needed   Refills:  0        MYLANTA PO   Dose:  2 teaspoonful        Take 2 teaspoonful by mouth as needed   Refills:  0        NEUTROGENA T/GEL 0.5 % Sham   Generic drug:  Coal Tar Extract        Refills:  0        OLANZAPINE PO   Dose:  5 tablet        Take 5 tablets by mouth 2 times daily   Refills:  0        ROBITUSSIN COLD COUGH+ CHEST PO        Take by mouth as needed   Refills:  0        rufinamide 400 MG tablet   Commonly known as:  BANZEL   Quantity:  450 tablet        Increase as instructed to 7 tabs (2800mg) orally every morning, and 8 tabs (3200mg) orally every evening.   Refills:  11        senna-docusate 8.6-50 MG per tablet   Commonly known as:  SENOKOT-S;PERICOLACE   Dose:  2 tablet        Take 2 tablets by mouth 2 times daily   Refills:  0        triamcinolone 0.1 % ointment   Commonly known as:  KENALOG   Dose:  0.5 inch        Apply 0.5 inches topically 2 times daily.   Refills:  0        TUMS PO        Take by mouth as needed   Refills:  0                Procedures and tests performed during your visit     CT Head w/o Contrast    XR Cervical Spine 2/3 Views      Orders Needing Specimen Collection     None      Pending Results     Date and Time Order Name Status Description    2/22/2017 2000 XR Cervical Spine 2/3 Views Preliminary             Pending Culture Results     No orders found from 2/20/2017 to 2/23/2017.             Test Results from your hospital stay     2/22/2017  8:20 PM - Interface, Radiant Ib      Narrative     CT SCAN OF THE HEAD WITHOUT CONTRAST   2/22/2017 8:17 PM      HISTORY: Seizure. Head injury.    TECHNIQUE:  Axial images of the head and coronal reformations without  IV contrast material. Radiation dose for this scan was reduced using  automated exposure control, adjustment of the mA and/or kV according  to patient size, or iterative reconstruction technique.    COMPARISON: 4/3/2016    FINDINGS:  The ventricles are normal in size, shape and configuration.   The brain parenchyma and subarachnoid spaces are normal. There is no  evidence of intracranial hemorrhage, mass, acute infarct or anomaly.     The visualized portions of the sinuses and mastoids appear normal.  There is no evidence of trauma.        Impression     IMPRESSION: Normal CT scan of the head.  No change.      JORGE LARA MD         2/22/2017  8:35 PM - Interface, Radiant Ib      Narrative     CERVICAL SPINE THREE VIEWS  2/22/2017 8:33 PM     HISTORY: Seizure and neck pain.    COMPARISON: 2/17/2017        Impression     IMPRESSION: Multilevel degenerative disc disease, unchanged. No  evidence of fracture or soft tissue swelling.                Clinical Quality Measure: Blood Pressure Screening     Your blood pressure was checked while you were in the emergency department today. The last reading we obtained was  BP: 107/66 . Please read the guidelines below about what these numbers mean and what you should do about them.  If your systolic blood pressure (the top number) is less than 120 and your diastolic blood pressure (the bottom number) is less than 80, then your blood pressure is normal. There is nothing more that you need to do about it.  If your systolic blood pressure (the top number) is 120-139 or your diastolic blood pressure (the bottom number) is 80-89, your blood pressure may be higher than it should be. You should have your blood pressure rechecked within a year by a primary care provider.  If your systolic blood pressure (the top number) is 140 or greater or your diastolic blood pressure (the  "bottom number) is 90 or greater, you may have high blood pressure. High blood pressure is treatable, but if left untreated over time it can put you at risk for heart attack, stroke, or kidney failure. You should have your blood pressure rechecked by a primary care provider within the next 4 weeks.  If your provider in the emergency department today gave you specific instructions to follow-up with your doctor or provider even sooner than that, you should follow that instruction and not wait for up to 4 weeks for your follow-up visit.        Thank you for choosing [facility]       Thank you for choosing [facility] for your care. Our goal is always to provide you with excellent care. Hearing back from our patients is one way we can continue to improve our services. Please take a few minutes to complete the written survey that you may receive in the mail after you visit with us. Thank you!        [employer] Information     [employer] lets you send messages to your doctor, view your test results, renew your prescriptions, schedule appointments and more. To sign up, go to [email or web address] . Click on \"Log in\" on the left side of the screen, which will take you to the Welcome page. Then click on \"Sign up Now\" on the right side of the page.     You will be asked to enter the access code listed below, as well as some personal information. Please follow the directions to create your username and password.     Your access code is: [email or web address]  Expires: 2017  4:41 PM     Your access code will  in 90 days. If you need help or a new code, please call your [facility] clinic or [phone].        Care EveryWhere ID     This is your Care EveryWhere ID. This could be used by other organizations to access your [facility] medical records  [id number]        After Visit Summary       This is your record. Keep this with you and show to your community pharmacist(s) and doctor(s) at your next visit.                  "

## 2017-02-23 ASSESSMENT — ENCOUNTER SYMPTOMS
HEADACHES: 1
SEIZURES: 1
WOUND: 1

## 2017-02-23 NOTE — ED PROVIDER NOTES
History     Chief Complaint:  Seizures    History is provided by the patient's care giver.   NGOC   Sharona Man is a 41 year old male who presents to the emergency department today for evaluation of a seizure and head injury. He had a witnessed 1 minute seizure right in front of his caregiver this evening and hit his head on a chair. He currently has a headache as well as a laceration to the right posterior parietal region. He is also complaining of some neck pain. He was not able to give a thorough history due to being mentally challenged and mainly non-verbal. He denies any other injuries though. He has a known seizure disorder and this does not seem to be unusual for him to have a seizure.     Allergies:  NKDA      Medications:      CLONAZEPAM PO   CLOZAPINE PO (CLOZARIL)   LORazepam (LORAZEPAM INTENSOL) 2 MG/ML concentrated solution   lamoTRIgine (LAMICTAL) 200 MG tablet   rufinamide (BANZEL) 400 MG tablet   OLANZAPINE PO   Coal Tar Extract (NEUTROGENA T/GEL) 0.5 % SHAM   bacitracin ophthalmic ointment   calamine lotion   Acetaminophen (CHLORASEPTIC SORE THROAT PO)   Dextromethorphan-Guaifenesin (ROBITUSSIN COLD COUGH+ CHEST PO)   Calcium Carbonate Antacid (TUMS PO)   BENZTROPINE MESYLATE PO   Sodium Fluoride (CLINPRO 5000) 1.1 % PSTE   senna-docusate (SENOKOT-S;PERICOLACE) 8.6-50 MG per tablet   ibuprofen (ADVIL,MOTRIN) 200 MG tablet   Loperamide-Simethicone (IMODIUM MULTI-SYMPTOM RELIEF) 2-125 MG CHEW   Magnesium Hydroxide (MILK OF MAGNESIA PO)   Calcium & Magnesium Carbonates (MYLANTA PO)   fish oil-omega-3 fatty acids (OMEGA 3) 1000 MG capsule   FLUoxetine (PROZAC) 40 MG capsule   famotidine (PEPCID) 20 MG tablet   urea (ATRAC-TAIN) 10 % cream   triamcinolone (KENALOG) 0.1 % ointment     Past Medical History:    Symptomatic generalized epilepsy  Autism  Severe mental retardation  Obstructive sleep apnea      Past Surgical History:   History reviewed. No significant past surgical history.      Family  History:  No family history on file     Social History:  Relationship status: Single  The patient denies smoking.   The patient denies alcohol use.   The patient presents with his caregiver and friends.    Review of Systems   Skin: Positive for wound (Laceration to the right parietal scalp).   Neurological: Positive for seizures and headaches.   All other systems reviewed and are negative.      Physical Exam   Vitals:  Patient Vitals for the past 24 hrs:   BP Temp Temp src Pulse Heart Rate SpO2   02/22/17 2119 114/71 - - 76 - 95 %   02/22/17 2000 116/66 - - - - -   02/22/17 1946 107/66 98.9  F (37.2  C) Oral 89 77 95 %     Physical Exam  Nursing note and vitals reviewed.  Constitutional:  Awake and alert.   HENT:   Nose:    Nose normal.   Mouth/Throat:   Mucous membranes are normal.   Eyes:    Conjunctivae normal and EOM are normal.      Pupils are equal, round, and reactive to light.   Neck:    Trachea normal.   Cardiovascular:  Normal rate, regular rhythm, normal heart sounds and normal pulses. No murmur heard.  Pulmonary/Chest:  Effort normal and breath sounds normal.   Abdominal:   Soft. Normal appearance and bowel sounds are normal.      There is no tenderness.      There is no rebound and no CVA tenderness.   Musculoskeletal:  Extremities atraumatic x 4. Tenderness to palpation in the cervical spine.   Lymphadenopathy:  No cervical adenopathy.   Neurological:   Awake and alert. Normal strength.      No cranial nerve deficit or sensory deficit. GCS eye subscore is 4. GCS verbal subscore is 5. GCS motor subscore is 6.   Skin:    2.5 cm laceration to the right parietal region without any surrounding hematoma. No rash noted.   Psychiatric:   Normal mood and affect.    Emergency Department Course     Imaging:  Radiology findings were communicated with the patient's caregiver who voiced understanding of the findings.    Head CT w/o contrast:  Normal CT scan of the head.  No change.  Reading per radiology    Cervical  spine x-ray:  Multilevel degenerative disc disease, unchanged. No  evidence of fracture or soft tissue swelling.  Reading per radiology    Procedures:     Laceration Repair      LACERATION:  A simple clean 2.5 cm laceration.    LOCATION:  Right parietal scalp.    FUNCTION:  Distally sensation and circulation are intact.    ANESTHESIA:  Local using 0.25 % Marcaine with Epi.    PREPARATION:  Irrigation with Normal Saline and Shur Clens.    DEBRIDEMENT:  No debridement.    CLOSURE:  Wound was closed with 8 Staples.        Emergency Department Course:  Nursing notes and vitals reviewed.  I performed an exam of the patient as documented above.   The patient was sent for a Head CT w/o contrast and Cervical spine x-ray while in the emergency department, results above.   At 2149 the patient was rechecked and the caregiver was updated on the results of the patient's imaging studies.   I discussed the treatment plan with the patient's caregiver. They expressed understanding of this plan and consented to discharge. They will be discharged home with instructions for care and follow up. In addition, the patient will return to the emergency department if their symptoms persist, worsen, if new symptoms arise or if there is any concern.  All questions were answered.  I personally reviewed the imaging results with the caregiver and answered all related questions prior to discharge.    Impression & Plan      Medical Decision Making:  Sharona Man is a 41 year old male with a known seizure disorder who was recently here for a seizure who presents to the emergency department today for evaluation of another seizure and head injury. Given that this has happened twice in a very short period of time and that he struck his head again, I obtained a CT of his head as well as cervical spine x-rays. These both came back unremarkable. I did not feel there was any indication to obtain blood work today, as he had this done recently. I  subsequently repaired the laceration per the above procedure note using staples. These should be removed in the next-7 days. Patient should follow up with his neurologist in the coming days as well.     Diagnosis:    ICD-10-CM    1. Nonintractable epilepsy without status epilepticus, unspecified epilepsy type (H) G40.909    2. 2.5 cm Laceration of scalp, initial encounter S01.01XA     8 staples   3. Cervical strain, initial encounter S16.1XXA        Scribe Disclosure:  Emil WEBER, am serving as a scribe at 12:16 AM on 2/23/2017 to document services personally performed by Candido Yoo MD, based on my observations and the provider's statements to me.    2/22/2017    EMERGENCY DEPARTMENT       Candido Yoo MD  02/23/17 0130

## 2017-02-23 NOTE — DISCHARGE INSTRUCTIONS
Discharge Instructions  Laceration (Cut)    You were seen today for a laceration (cut).  Your doctor examined your laceration for any problems such a buried foreign body (like glass, a splinter, or gravel), or injury to blood vessels, tendons, and nerves.  Your doctor may have also rinsed and/or scrubbed your laceration to help prevent an infection.  Your laceration may have been closed with glue, staples or sutures (stitches).      It may not be possible to find all problems with your laceration on the first visit, and we can't always prevent infections.  Antibiotics are only given when the benefit is more than the risk, and don't prevent all infections. Some lacerations are too high risk to close, and are left open to heal.  All lacerations, no matter how expertly repaired, will cause scarring.    Return to the Emergency Department right away if:    You have more redness, swelling, pain, drainage (pus), a bad smell, or red streaking from your laceration.      You have a fever of 101oF or more.    You have bleeding that you can t stop at home. If your cut starts to bleed, hold pressure on the bleeding area with a clean cloth or put pressure over the bandage.  If the bleeding doesn t stop after using constant pressure for 30 minutes, you should return to the Emergency Department for further treatment.    An area past the laceration is cool, pale, or blue compared with the other side, or has a slower return of color when squeezed.    Your dressing seems too tight or starts to get uncomfortable or painful.    You have loss of normal function or use of an area, such as being unable to straighten or bend a finger normally.    You have a numb area past the laceration.    Return to the Emergency Department or see your regular doctor if:    The laceration starts to come open.     You have something coming out of the cut or a feeling that there is something in the laceration.    Your wound will not heal, or keeps breaking  "open. There can always be glass, wood, dirt or other things in any wound.  They won t always show up, even on x-rays.  If a wound doesn t heal, this may be why, and it is important to follow-up with your regular doctor.    Home Care:    Take your dressing off in 12 hours, or as instructed by your doctor, to check your laceration. Remove the dressing sooner if it seems too tight or painful, or if it is getting numb, tingly, or pale past the dressing.    Gently wash your laceration 2 times a day with clean cloth and soap.     It is okay to shower, but do not let the laceration soak in water.      If your laceration was closed with wound adhesive or strips: pat it dry and leave it open to the air.     For all other repairs: after you wash your laceration, or at least 2 times a day, apply bacitracin or other antibiotic ointment to the laceration, then cover it with a Band-Aid  or gauze.    Keep the laceration clean. Wear gloves or other protective clothing if you are around dirt.    Follow-up:    You need to follow-up with your neurologist as soon as possible.    Your sutures or staples need to be removed in about 7 days. Return here or schedule an appointment with your regular doctor to have this done.    Scars:  To help minimize scarring:    Wear sunscreen over the healed laceration when out in the sun.    Massage the area regularly.    You may use Vitamin E oil.    Wait a year.  Most scars will start to fade within a year.    Probiotics: If you have been given an antibiotic, you may want to also take a probiotic pill or eat yogurt with live cultures. Probiotics have \"good bacteria\" to help your intestines stay healthy. Studies have shown that probiotics help prevent diarrhea and other intestine problems (including C. diff infection) when you take antibiotics. You can buy these without a prescription in the pharmacy section of the store.     If you were given a prescription for medicine here today, be sure to read all " of the information (including the package insert) that comes with your prescription.  This will include important information about the medicine, its side effects, and any warnings that you need to know about.  The pharmacist who fills the prescription can provide more information and answer questions you may have about the medicine.  If you have questions or concerns that the pharmacist cannot address, please call or return to the Emergency Department.     Opioid Medication Information    Pain medications are among the most commonly prescribed medicines, so we are including this information for all our patients. If you did not receive pain medication or get a prescription for pain medicine, you can ignore it.     You may have been given a prescription for an opioid (narcotic) pain medicine and/or have received a pain medicine while here in the Emergency Department. These medicines can make you drowsy or impaired. You must not drive, operate dangerous equipment, or engage in any other dangerous activities while taking these medications. If you drive while taking these medications, you could be arrested for DUI, or driving under the influence. Do not drink any alcohol while you are taking these medications.     Opioid pain medications can cause addiction. If you have a history of chemical dependency of any type, you are at a higher risk of becoming addicted to pain medications.  Only take these prescribed medications to treat your pain when all other options have been tried. Take it for as short a time and as few doses as possible. Store your pain pills in a secure place, as they are frequently stolen and provide a dangerous opportunity for children or visitors in your house to start abusing these powerful medications. We will not replace any lost or stolen medicine.  As soon as your pain is better, you should flush all your remaining medication.     Many prescription pain medications contain Tylenol   (acetaminophen), including Vicodin , Tylenol #3 , Norco , Lortab , and Percocet .  You should not take any extra pills of Tylenol  if you are using these prescription medications or you can get very sick.  Do not ever take more than 3000 mg of acetaminophen in any 24 hour period.    All opioids tend to cause constipation. Drink plenty of water and eat foods that have a lot of fiber, such as fruits, vegetables, prune juice, apple juice and high fiber cereal.  Take a laxative if you don t move your bowels at least every other day. Miralax , Milk of Magnesia, Colace , or Senna  can be used to keep you regular.      Remember that you can always come back to the Emergency Department if you are not able to see your regular doctor in the amount of time listed above, if you get any new symptoms, or if there is anything that worries you.      Discharge Instructions  Recurrent Seizure (Convulsion)    You were seen today for a seizure. The most common reason for a recurrent seizure is having missed a dose of your medication or taken it at a different time than normal. Other things that increase the risk of seizures include fever, sleep deprivation, alcohol, and stress. Although anti-seizure medications (anti-epileptic drugs) work for many people with seizure disorders, some people continue to have seizures even after trying several medications.    You need to follow-up with your doctor within 1-2 days.    Return to the Emergency Department if:     You develop a fever over 101.    You feel much more ill, or develop new symptoms like severe headache.    You have severe nausea or vomiting.    You have trouble walking, seeing, or develop weakness or numbness in your arms or legs.     What can I do to help myself?    Take your medication exactly as directed, at the right times, and the right doses.     If you develop uncomfortable side effects, don't stop taking your anti-seizure medication without first speaking to your physician.      Do not let your prescription run out. Stopping anti-seizure medication abruptly can put your at risk of a seizure.    While taking an anti-seizure medication, do not start taking any other medications including over-the-counter medications and herbal supplements without first checking with your physician because mixing them can be dangerous.    Do not drive until you have been rechecked by your doctor and have been told it is safe to drive.  If you have a seizure while driving you may cause a motor vehicle accident with injury or death to yourself or others.     Do not swim, climb ladders, or do anything else that would be dangerous if you had another seizure or spell of loss of consciousness, until you are cleared by your doctor.      Check your state driving requirements for patients with seizures on the Epilepsy Foundation Website at www.epilepsyfoundation.org/resources/drivingandtravel.cfm.    Do not drink alcohol.  Drinking alcohol increases the risk of seizures and can interfere with the effect of anti-seizure medications.    Start a seizure calendar to record any seizure triggers, such as days when you were sleep-deprived, stressed, drank alcohol, or (if you are a woman) had your period.    Remember, if one medication does not work for you, either because you cannot tolerate the side effects or because you continue to have seizures, your physician can suggest alternate medications or alternate methods of taking the medication.  If you were given a prescription for medicine here today, be sure to read all of the information (including the package insert) that comes with your prescription.  This will include important information about the medicine, its side effects, and any warnings that you need to know about.  The pharmacist who fills the prescription can provide more information and answer questions you may have about the medicine.  If you have questions or concerns that the pharmacist cannot address,  please call or return to the Emergency Department.   Opioid Medication Information    Pain medications are among the most commonly prescribed medicines, so we are including this information for all our patients. If you did not receive pain medication or get a prescription for pain medicine, you can ignore it.     You may have been given a prescription for an opioid (narcotic) pain medicine and/or have received a pain medicine while here in the Emergency Department. These medicines can make you drowsy or impaired. You must not drive, operate dangerous equipment, or engage in any other dangerous activities while taking these medications. If you drive while taking these medications, you could be arrested for DUI, or driving under the influence. Do not drink any alcohol while you are taking these medications.     Opioid pain medications can cause addiction. If you have a history of chemical dependency of any type, you are at a higher risk of becoming addicted to pain medications.  Only take these prescribed medications to treat your pain when all other options have been tried. Take it for as short a time and as few doses as possible. Store your pain pills in a secure place, as they are frequently stolen and provide a dangerous opportunity for children or visitors in your house to start abusing these powerful medications. We will not replace any lost or stolen medicine.  As soon as your pain is better, you should flush all your remaining medication.     Many prescription pain medications contain Tylenol  (acetaminophen), including Vicodin , Tylenol #3 , Norco , Lortab , and Percocet .  You should not take any extra pills of Tylenol  if you are using these prescription medications or you can get very sick.  Do not ever take more than 3000 mg of acetaminophen in any 24 hour period.    All opioids tend to cause constipation. Drink plenty of water and eat foods that have a lot of fiber, such as fruits, vegetables, prune  juice, apple juice and high fiber cereal.  Take a laxative if you don t move your bowels at least every other day. Miralax , Milk of Magnesia, Colace , or Senna  can be used to keep you regular.      Remember that you can always come back to the Emergency Department if you are not able to see your regular doctor in the amount of time listed above, if you get any new symptoms, or if there is anything that worries you.

## 2017-02-23 NOTE — ED NOTES
Bed: ED28  Expected date: 2/22/17  Expected time: 7:35 PM  Means of arrival: Ambulance  Comments:  [facility] [address] 41M seizure/postictal

## 2017-03-03 NOTE — ED NOTES
Staples removed, wound healing well. No signs or symptoms of infection. Patient advised to follow up with PCP for further concerns.

## 2017-03-28 DIAGNOSIS — G40.319 GENERALIZED CONVULSIVE EPILEPSY WITH INTRACTABLE EPILEPSY (H): ICD-10-CM

## 2017-03-28 RX ORDER — LORAZEPAM 2 MG/ML
SOLUTION, CONCENTRATE ORAL
Qty: 60 ML | Refills: 5 | OUTPATIENT
Start: 2017-03-28

## 2017-05-26 ENCOUNTER — DOCUMENTATION ONLY (OUTPATIENT)
Dept: OTHER | Facility: CLINIC | Age: 42
End: 2017-05-26

## 2017-05-26 PROBLEM — Z71.89 ACP (ADVANCE CARE PLANNING): Chronic | Status: ACTIVE | Noted: 2017-05-26

## 2020-11-17 ENCOUNTER — APPOINTMENT (OUTPATIENT)
Dept: GENERAL RADIOLOGY | Facility: CLINIC | Age: 45
End: 2020-11-17
Attending: EMERGENCY MEDICINE
Payer: MEDICARE

## 2020-11-17 ENCOUNTER — APPOINTMENT (OUTPATIENT)
Dept: CT IMAGING | Facility: CLINIC | Age: 45
End: 2020-11-17
Attending: EMERGENCY MEDICINE
Payer: MEDICARE

## 2020-11-17 ENCOUNTER — HOSPITAL ENCOUNTER (EMERGENCY)
Facility: CLINIC | Age: 45
Discharge: HOME | End: 2020-11-17
Attending: EMERGENCY MEDICINE | Admitting: EMERGENCY MEDICINE
Payer: MEDICARE

## 2020-11-17 VITALS
DIASTOLIC BLOOD PRESSURE: 76 MMHG | TEMPERATURE: 98.7 F | SYSTOLIC BLOOD PRESSURE: 126 MMHG | OXYGEN SATURATION: 97 % | HEART RATE: 84 BPM | RESPIRATION RATE: 18 BRPM
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
## 2020-11-17 DIAGNOSIS — E87.6 HYPOKALEMIA: ICD-10-CM

## 2020-11-17 DIAGNOSIS — R26.9 GAIT ABNORMALITY: ICD-10-CM

## 2020-11-17 DIAGNOSIS — R41.89 COGNITIVE IMPAIRMENT: ICD-10-CM

## 2020-11-17 LAB
ALBUMIN SERPL-MCNC: 3.1 G/DL (ref 3.4–5)
ALBUMIN UR-MCNC: NEGATIVE MG/DL
ALP SERPL-CCNC: 114 U/L (ref 40–150)
ALT SERPL W P-5'-P-CCNC: 47 U/L (ref 0–70)
ANION GAP SERPL CALCULATED.3IONS-SCNC: 7 MMOL/L (ref 3–14)
APPEARANCE UR: CLEAR
AST SERPL W P-5'-P-CCNC: 49 U/L (ref 0–45)
BASOPHILS # BLD AUTO: 0 10E9/L (ref 0–0.2)
BASOPHILS NFR BLD AUTO: 0.2 %
BILIRUB SERPL-MCNC: 0.3 MG/DL (ref 0.2–1.3)
BILIRUB UR QL STRIP: NEGATIVE
BUN SERPL-MCNC: 7 MG/DL (ref 7–30)
CALCIUM SERPL-MCNC: 8.8 MG/DL (ref 8.5–10.1)
CHLORIDE SERPL-SCNC: 107 MMOL/L (ref 94–109)
CO2 SERPL-SCNC: 27 MMOL/L (ref 20–32)
COLOR UR AUTO: YELLOW
CREAT SERPL-MCNC: 1.01 MG/DL (ref 0.66–1.25)
DIFFERENTIAL METHOD BLD: ABNORMAL
EOSINOPHIL # BLD AUTO: 0.1 10E9/L (ref 0–0.7)
EOSINOPHIL NFR BLD AUTO: 1.5 %
ERYTHROCYTE [DISTWIDTH] IN BLOOD BY AUTOMATED COUNT: 16.1 % (ref 10–15)
GFR SERPL CREATININE-BSD FRML MDRD: 88 ML/MIN/{1.73_M2}
GLUCOSE SERPL-MCNC: 108 MG/DL (ref 70–99)
GLUCOSE UR STRIP-MCNC: NEGATIVE MG/DL
HCT VFR BLD AUTO: 35.2 % (ref 40–53)
HGB BLD-MCNC: 11.8 G/DL (ref 13.3–17.7)
HGB UR QL STRIP: NEGATIVE
IMM GRANULOCYTES # BLD: 0 10E9/L (ref 0–0.4)
IMM GRANULOCYTES NFR BLD: 0.2 %
INTERPRETATION ECG - MUSE: NORMAL
INTERPRETATION ECG - MUSE: NORMAL
KETONES UR STRIP-MCNC: NEGATIVE MG/DL
LEUKOCYTE ESTERASE UR QL STRIP: NEGATIVE
LYMPHOCYTES # BLD AUTO: 1.1 10E9/L (ref 0.8–5.3)
LYMPHOCYTES NFR BLD AUTO: 22.6 %
MCH RBC QN AUTO: 26.7 PG (ref 26.5–33)
MCHC RBC AUTO-ENTMCNC: 33.5 G/DL (ref 31.5–36.5)
MCV RBC AUTO: 80 FL (ref 78–100)
MONOCYTES # BLD AUTO: 0.4 10E9/L (ref 0–1.3)
MONOCYTES NFR BLD AUTO: 9.3 %
NEUTROPHILS # BLD AUTO: 3.1 10E9/L (ref 1.6–8.3)
NEUTROPHILS NFR BLD AUTO: 66.2 %
NITRATE UR QL: NEGATIVE
NRBC # BLD AUTO: 0 10*3/UL
NRBC BLD AUTO-RTO: 0 /100
PH UR STRIP: 7 PH (ref 5–7)
PLATELET # BLD AUTO: 424 10E9/L (ref 150–450)
POTASSIUM SERPL-SCNC: 3.1 MMOL/L (ref 3.4–5.3)
PROT SERPL-MCNC: 7.6 G/DL (ref 6.8–8.8)
RBC # BLD AUTO: 4.42 10E12/L (ref 4.4–5.9)
SODIUM SERPL-SCNC: 141 MMOL/L (ref 133–144)
SOURCE: NORMAL
SP GR UR STRIP: 1.01 (ref 1–1.03)
TROPONIN I SERPL-MCNC: <0.015 UG/L (ref 0–0.04)
UROBILINOGEN UR STRIP-MCNC: NORMAL MG/DL (ref 0–2)
WBC # BLD AUTO: 4.7 10E9/L (ref 4–11)

## 2020-11-17 PROCEDURE — 250N000013 HC RX MED GY IP 250 OP 250 PS 637: Performed by: EMERGENCY MEDICINE

## 2020-11-17 PROCEDURE — 84484 ASSAY OF TROPONIN QUANT: CPT | Performed by: EMERGENCY MEDICINE

## 2020-11-17 PROCEDURE — 80053 COMPREHEN METABOLIC PANEL: CPT | Performed by: EMERGENCY MEDICINE

## 2020-11-17 PROCEDURE — 81003 URINALYSIS AUTO W/O SCOPE: CPT | Performed by: EMERGENCY MEDICINE

## 2020-11-17 PROCEDURE — 93005 ELECTROCARDIOGRAM TRACING: CPT | Mod: 76

## 2020-11-17 PROCEDURE — 99285 EMERGENCY DEPT VISIT HI MDM: CPT | Mod: 25

## 2020-11-17 PROCEDURE — 71045 X-RAY EXAM CHEST 1 VIEW: CPT

## 2020-11-17 PROCEDURE — 70450 CT HEAD/BRAIN W/O DYE: CPT

## 2020-11-17 PROCEDURE — 93005 ELECTROCARDIOGRAM TRACING: CPT

## 2020-11-17 PROCEDURE — 85025 COMPLETE CBC W/AUTO DIFF WBC: CPT | Performed by: EMERGENCY MEDICINE

## 2020-11-17 RX ORDER — POTASSIUM CHLORIDE 1.5 G/1.58G
40 POWDER, FOR SOLUTION ORAL ONCE
Status: COMPLETED | OUTPATIENT
Start: 2020-11-17 | End: 2020-11-17

## 2020-11-17 RX ADMIN — POTASSIUM CHLORIDE 40 MEQ: 1.5 POWDER, FOR SOLUTION ORAL at 15:01

## 2020-11-17 NOTE — ED NOTES
Bed: ED06  Expected date: 11/17/20  Expected time: 1:11 PM  Means of arrival: Ambulance  Comments:  514 54m altered +COVID, ETA 1311

## 2020-11-17 NOTE — ED AVS SNAPSHOT
[facility] Emergency Dept  [address] [address]  Phone: [phone]  Fax: [phone]                                    Sharona Man   MRN: [id number]    Department: [facility] Emergency Dept   Date of Visit: 11/17/2020           After Visit Summary Signature Page    I have received my discharge instructions, and my questions have been answered. I have discussed any challenges I see with this plan with the nurse or doctor.    ..........................................................................................................................................  Patient/Patient Representative Signature      ..........................................................................................................................................  Patient Representative Print Name and Relationship to Patient    ..................................................               ................................................  Date                                   Time    ..........................................................................................................................................  Reviewed by Signature/Title    ...................................................              ..............................................  Date                                               Time          22EPIC Rev 08/18

## 2020-11-17 NOTE — ED NOTES
Spoke with Nadine, Health Care Agent notifying her Salo will be transported back to care facility via stretcher.

## 2020-11-17 NOTE — ED TRIAGE NOTES
Lives in group home. Staff report he had decreased appetite today with unsteady gait. Normal ambulates without assistant. Paramedics found pt at lunch table alert hold hands in air. History of epilepsy and autism. No report of seizure today. Yesterday was acting appropriately. Health Care agent states he fell out of bed last week Tuesday which is unusual for him. Unknown if this was a witnessed fall or if there was a LOC. Ambulates independently. Had a virtual visit with healthcare provider after fall. Denies no diagnostic imagining.

## 2020-11-17 NOTE — ED NOTES
GEGE Schaefer from [facility] called for update. Will fax current medication list. States he had an assisted slip and fall last week. Has been having an unsteady gait since last week.

## 2020-11-17 NOTE — ED NOTES
Called medical guardian, Nadine Del Valle at 1400 hours updating her on his arrival status to ER.

## 2020-11-17 NOTE — ED PROVIDER NOTES
History   Chief Complaint:  Altered Mental Status    History is limited secondary to altered mental status. Available history is provided by [facility] chart review and nursing report.     HPI  Sharona Man is a 44 year old male, with a history of generalized epilepsy, schizoaffective disorder, autism, MR, and a positive COVID test, who presents to the ED for evaluation of decreased appetite and an unsteady gait, dating to a fall that occurred 1 week ago. He ambulates independently. He was apparently eased to the ground by staff, though the details of the fall a week ago are unclear. No known recent seizure.  He has minimally verbal at baseline due to severe mental retardation. He had a positive Covid test 11/3/20, pursued due to a fever at that time, without recent fevers or any respiratory symptoms by report. He is on Lamictal and Ativan. He has apparently not had any neuroimaging since the fall. No vomiting. No apparent new pain. Paramedics found him sitting in a lunch table alert and holding his hands in the air.    Allergies:  No known allergies    Medications:    Lorazepam  Fluoxetine  Rufinamide  Lamotrigine  Olanzapine  Clonazepam  Topamax  Pepcid  Colace  Pericolace  Imodium  Mylanta  Benztropine mesylate    Past Medical History:    Obstructive sleep apnea  MR  Generalized epilepsy  Autism  Schizoaffective disorder  Hyperlipidemia  Anemia  Atopic dermatitis  Obesity    Past Surgical History:    The patient does not have any pertinent past surgical history.     Family History:    Substance abuse     Social History:  Marital Status: Single  Smoking status: No  Alcohol use: No  Drug use: No  PCP: Rosa Hinds    Review of Systems  Unable to obtain full review of systems as patient is very poor historian and nearly nonverbal    Physical Exam     Patient Vitals for the past 24 hrs:   BP Temp Temp src Pulse Resp SpO2   11/17/20 1600 131/85 -- -- 85 12 91 %   11/17/20 1530 131/83 -- -- 87 (!) 33 98 %    11/17/20 1500 (!) 135/125 -- -- 91 12 97 %   11/17/20 1430 122/76 -- -- 93 14 98 %   11/17/20 1400 121/76 -- -- 89 15 98 %   11/17/20 1335 131/83 99  F (37.2  C) Oral 93 16 98 %     Physical Exam  General: Nontoxic-appearing male sitting upright in room 6  HENT: mucous membranes moist, OP clear  CV: rate as above, regular rhythm, no LE edema  Resp: normal effort, no stridor, no cough observed  GI: abdomen soft and nontender, no guarding  MSK: no bony tenderness, no CVAT, chest wall nontender, pelvis stable  No spinal tenderness eyes open, no nystagmus,  equal, lifts both legs off bed  Skin: appropriately warm and dry  Neuro: awake, alert,  equal, lift both legs off bed, no nuchal rigidity, he has 1 and 2 word responses to some questions though unreliable historian  Psych: Cooperates as able    Emergency Department Course   ECG (13:28:33):  Rate 91 bpm. GA interval 148. QRS duration 82. QT/QTc 374 460. P-R-T axes 55 27 17. Normal sinus rhythm most likely. Possible left atrial enlargement. Interpreted at 1430 by Dariusz Francois MD.    ECG (14:51:05):  Rate 89 bpm. GA interval 134. QRS duration 86. QT/QTc 360 438. P-R-T axes 58 41 -20. Nonspecific T wave abnormality. Abnormal ECG. Sinus rhythm vs. 2:1 Aflutter. Interpreted at 1500 by Dariusz Francois MD.    Imaging:  Radiology findings were communicated with the patient who voiced understanding of the findings.    XR Chest, portable, 1 view:  Portable AP view of the chest was obtained. Stable   cardiomediastinal silhouette. No suspicious focal pulmonary opacities.   No significant pleural effusion or pneumothorax.     CT Head without contrast:  No acute intracranial abnormality.    Imaging independently reviewed and agree with radiologist interpretation.     Laboratory:  Laboratory findings were communicated with the patient who voiced understanding of the findings.    CBC: HGB 11.8 (L) o/w WNL (WBC 4.7, )    CMP: K 3.1 (L), Glucose  "108 (H), Albumin 3.1 (L), AST 49 (H) o/w WNL (Creatinine 1.01)     Troponin (Collected 1507): <0.015    UA with micro: negative    Interventions:  1501: Potassium chloride 40 mEq PO    Emergency Department Course:  Past medical records, nursing notes, and vitals reviewed.    1430 I performed an exam of the patient as documented above.     EKG obtained in the ED, see results above.   IV was inserted and blood was drawn for laboratory testing, results above.  The patient provided a urine sample here in the emergency department. This was sent for laboratory testing, findings above.  The patient was sent for a chest xray and head CT while in the emergency department, results above.     The patient passed a \"road test\" prior to discharge from the ED.    1745 I rechecked the patient and discussed the results of his workup thus far.     Findings and plan explained to the Patient. Patient discharged home with instructions regarding supportive care, medications, and reasons to return. The importance of close follow-up was reviewed.    I personally reviewed the laboratory and imaging results with the Patient and answered all related questions prior to discharge.     Impression & Plan    Medical Decision Making:   Regarding his fall about a week ago, no serious injuries are detected or suspected based on his evaluation today, with reassurance from his head CT which does not reveal  any significant trauma or intracranial hemorrhage.  Stroke was also considered but thought to be less likely, especially given that that he is able to walk without any appreciable deficit at this time.  His cognitive impairment certainly made history more difficult.  Work-up here does not reveal any worrisome evidence of infection other than the recently diagnosed Covid infection, which only could explain much of his presentation.  I do not identify the need for hospitalization.  Vital signs are satisfactory work of breathing is normal.  He has mild " hypokalemia which was replaced, not likely directly related to his presenting symptoms.  His abdomen is completely benign.  I think he is appropriate for transfer back to his care center for ongoing care.  We updated his residential facility as well as his guardian by phone, all of whom are in agreement.    Diagnosis:     ICD-10-CM    1. Fall, initial encounter  W19.XXXA    2. Gait abnormality  R26.9    3. Cognitive impairment  R41.89    4. Hypokalemia  E87.6      Disposition:   Discharged to residential facility    Scribe Disclosure:  I, Elsie Dominguez, am serving as a scribe on 11/17/2020 at 2:30 PM to personally document services performed by Dariusz Francois MD, based on my observations and the provider's statements to me.      Scribe Disclosure:  I, Burton Morillo, am serving as a scribe at 5:56 PM on 11/17/2020 to document services personally performed by Dariusz Francois MD based on my observations and the provider's statements to me.    This note was completed in part using Dragon voice recognition software. Although reviewed after completion, some word and grammatical errors may occur.         Dariusz Francois MD  11/17/20 4724

## 2020-11-19 ENCOUNTER — AMBULATORY - HEALTHEAST (OUTPATIENT)
Dept: OTHER | Facility: CLINIC | Age: 45
End: 2020-11-19

## 2021-05-18 ENCOUNTER — TELEPHONE (OUTPATIENT)
Dept: NEUROLOGY | Facility: CLINIC | Age: 46
End: 2021-05-18

## 2021-08-09 ENCOUNTER — OFFICE VISIT (OUTPATIENT)
Dept: NEUROLOGY | Facility: CLINIC | Age: 46
End: 2021-08-09
Payer: MEDICAID

## 2021-08-09 VITALS
HEART RATE: 69 BPM | BODY MASS INDEX: 25.93 KG/M2 | WEIGHT: 160.6 LBS | SYSTOLIC BLOOD PRESSURE: 113 MMHG | DIASTOLIC BLOOD PRESSURE: 71 MMHG

## 2021-08-09 DIAGNOSIS — G40.909 RECURRENT SEIZURES (H): Primary | ICD-10-CM

## 2021-08-09 RX ORDER — CANNABIDIOL 100 MG/ML
SOLUTION ORAL
Qty: 250 ML | Refills: 2 | Status: SHIPPED | OUTPATIENT
Start: 2021-08-09 | End: 2021-11-08

## 2021-08-09 RX ORDER — PRAMOXINE HYDROCHLORIDE 10 MG/ML
LOTION TOPICAL 3 TIMES DAILY
COMMUNITY
End: 2022-08-12

## 2021-08-09 RX ORDER — TOPIRAMATE SPINKLE 25 MG/1
50 CAPSULE ORAL 2 TIMES DAILY
COMMUNITY
End: 2022-08-11

## 2021-08-09 NOTE — PROGRESS NOTES
"Service Date: 08/09/2021    CLINIC NOTE    CHIEF COMPLAINT:  Reestablish care for his epilepsy.    HISTORY OF PRESENT ILLNESS:  This patient is a 45-year-old, right-handed male with history of intractable epilepsy and history of developmental delay who presents today to reestablish care for his epilepsy.  He was previously seen at the [facility] for many years.  He was last seen by Dr. Echeverria in 2015.  For the past 6 years, he was seen at [place].  The patient is accompanied by his group home staff in the clinic today.  One of the goals of the group home staff today is to see if the patient can start on Epidiolex to control his seizures better.  The patient's guardian is Nadine Del Valle; her telephone number is [phone].  The patient also has a psychiatrist, Ivelisse Arauz, at [phone].    The patient started to have seizures probably in early childhood.  His seizures have been poorly controlled for many years.  At the present time, he continues to have frequent seizures. According to the group home staff, he is now still having one drop seizure about once every 6 weeks.  The patient moved to the new group home about 6 months ago.    The patient is currently on Banzel 2000 mg t.i.d., Lamictal 200 mg in the morning and 300 mg in the evening and Topamax 100 mg twice daily.    The patient has 2 types of seizures. Type #1 is described as \"drop seizures.\"  These are most likely atonic seizures. He will fall on the ground and shake. This will last for less than 2 minutes.  These are happening about once every 6 weeks at the present time.    Type #2 is probably generalized tonic-clonic seizures. These are infrequent.  The exact frequency is unclear at this time.  The group home staff thought they did not observe any for the past 6 months.    TRIGGERS FOR SEIZURES:  Unclear.    RISK FACTORS FOR SEIZURES: History of developmental delay.  No history of head trauma with loss of consciousness or febrile " convulsions.    Current Outpatient Medications   Medication Sig Dispense Refill     Calcium Carbonate Antacid (TUMS PO) Take by mouth as needed       cannabidiol (EPIDIOLEX) 100 MG/ML oral solution Take 1.82 mLs (182 mg) by mouth 2 times daily for 30 days, THEN 3.64 mLs (364 mg) 2 times daily. 250 mL 2     CLONAZEPAM PO Take 0.25 mg by mouth 2 times daily as needed        CLOZAPINE PO (CLOZARIL) Take 125 mg by mouth daily        Dextromethorphan-Guaifenesin (ROBITUSSIN COLD COUGH+ CHEST PO) Take by mouth as needed       famotidine (PEPCID) 20 MG tablet Take 20 mg by mouth At Bedtime.       fish oil-omega-3 fatty acids (OMEGA 3) 1000 MG capsule Take 2 g by mouth 2 times daily.       FLUoxetine (PROZAC) 40 MG capsule Take 40 mg by mouth 2 times daily.       ibuprofen (ADVIL,MOTRIN) 200 MG tablet Take 200 mg by mouth every 4 hours as needed for mild pain       lamoTRIgine (LAMICTAL) 200 MG tablet Take 1.5 tablets (300mg) by mouth three times a day. (Patient taking differently: 200 mg in the am 300 mg in the evening) 135 tablet 5     Loperamide-Simethicone (IMODIUM MULTI-SYMPTOM RELIEF) 2-125 MG CHEW Take by mouth as needed       LORazepam (LORAZEPAM INTENSOL) 2 MG/ML concentrated solution Give 1 ml (2mg) between cheek and gums for any seizure > 3 min or 2 seizures in 1 h or 3 seizures in 24 h. No more than 2 doses in 24 hours. 60 mL 0     Magnesium Hydroxide (MILK OF MAGNESIA PO) Take 2 teaspoonful by mouth as needed       OLANZAPINE PO Take 5 tablets by mouth 2 times daily 5 mg in the am, 10 mg in the evening       pramoxine (PRAX) 1 % LOTN lotion Place rectally 3 times daily       rufinamide (BANZEL) 400 MG tablet Increase as instructed to 7 tabs (2800mg) orally every morning, and 8 tabs (3200mg) orally every evening. 450 tablet 11     senna-docusate (SENOKOT-S;PERICOLACE) 8.6-50 MG per tablet Take 2 tablets by mouth 2 times daily       Skin Protectants, Misc. (EUCERIN) cream Apply topically as needed for dry skin        topiramate (TOPAMAX) 25 MG capsule Take 100 mg by mouth 2 times daily         ALLERGIES:  No known drug allergies.    PAST MEDICAL HISTORY:  Symptomatic generalized epilepsy, developmental delay, obstructive sleep apnea.    PAST SURGICAL HISTORY:  None.    FAMILY HISTORY:  No family history of seizures.    SOCIAL HISTORY:  He lives in a group home.  No smoking, no alcohol, no drug abuse.  His guardian is Nadine Del Valle.    REVIEW OF SYSTEMS:  Unable to obtain.    PHYSICAL EXAMINATION:      Blood pressure 113/71, pulse 69, weight 160 lb 9.6 oz (72.8 kg).    General exam: General Appearance: No acute distress. HEENT: Normocephalic, atraumatic. Neck: Supple.  Extremities: No edema, no clubbing, no cyanosis.      NEUROLOGIC:  Alert, oriented x1. Follows most commands. Can say a few words, but not fluent.  No facial weakness.  Tongue and palate midline.  Pupils are equal, round and reactive to light.  Extraocular movements intact.  Motor exam: Moving all extremities.  Normal bulk, normal tone.  Strength probably 5/5 in all extremities.  Coordination:  No ataxia.  Sensory exam is grossly normal, although it is difficult to assess.  Gait: Normal casual gait.  No difficulties with tiptoe or heel walking.  He did not perform tandem gait because he did not understand.    PREVIOUS DIAGNOSTIC TESTING:  CT head on 02/22/2017 showed normal CT of the head.  Repeat CT head on 11/17/2020 showed no acute abnormalities.  EEG on 10/12/2010 showed generalized slowing of the background activities with bitemporal independent epileptiform activities.    IMPRESSION:    1.  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  The patient continues to have frequent drop attacks about once every 4-6 weeks that sometimes cause injury in spite of the fact that he is taking 3 antiseizure medications with high doses.  I think it is reasonable to try Epidiolex to see if his seizures can be controlled better.  I also brought up  the suggestion for surgical options, such as VNS and a corpus callosotomy, in the future if Epidiolex does not control his seizures better.  His group home staff will discuss with his guardian.  2.  Developmental delay.    PLAN:    1.  Continue Lamictal 200-300, rufinamide 2000 mg t.i.d. and Topamax 100 mg b.i.d.   2.  Start Epidiolex at 2.5 mg/kg per day b.i.d. for 1 month, then increase to 5 mg/kg per day b.i.d.  3.  We will discuss other options, such as VNS and corpus callosotomy, in the future with his guardian, Nadine Del Valle.  4.  Return to clinic in 3 months.      A total of 65 minutes was spent on this visit     40 minutes was spent in face-to-face time   10 minutes was spent on chart review and prescriptions  15 minutes was spent on documentation     Juan Luis Cruz MD        D: 2021   T: 2021   MT: magda    Name:     ADELINE LNOG  MRN:      [id number]        Account:      [id number]   :      1975           Service Date: 2021       Document: [id number]

## 2021-08-09 NOTE — LETTER
"2021       RE: Sharona Man  : 1975   MRN: [id number]      Dear Colleague,    Thank you for referring your patient, Sharona Man, to the [facility] at [facility]. Please see a copy of my visit note below.    Service Date: 2021    CLINIC NOTE    CHIEF COMPLAINT:  Reestablish care for his epilepsy.    HISTORY OF PRESENT ILLNESS:  This patient is a 45-year-old, right-handed male with history of intractable epilepsy and history of developmental delay who presents today to reestablish care for his epilepsy.  He was previously seen at the [facility] for many years.  He was last seen by Dr. Echeverria in 2015.  For the past 6 years, he was seen at [place].  The patient is accompanied by his group home staff in the clinic today.  One of the goals of the group home staff today is to see if the patient can start on Epidiolex to control his seizures better.  The patient's guardian is Nadine Del Valle; her telephone number is [phone].  The patient also has a psychiatrist, Ivelisse Arauz, at [phone].    The patient started to have seizures probably in early childhood.  His seizures have been poorly controlled for many years.  At the present time, he continues to have frequent seizures. According to the group home staff, he is now still having one drop seizure about once every 6 weeks.  The patient moved to the new group home about 6 months ago.    The patient is currently on Banzel 2000 mg t.i.d., Lamictal 200 mg in the morning and 300 mg in the evening and Topamax 100 mg twice daily.    The patient has 2 types of seizures. Type #1 is described as \"drop seizures.\"  These are most likely atonic seizures. He will fall on the ground and shake. This will last for less than 2 minutes.  These are happening about once every 6 weeks at the present time.    Type #2 is probably generalized tonic-clonic seizures. These " are infrequent.  The exact frequency is unclear at this time.  The group home staff thought they did not observe any for the past 6 months.    TRIGGERS FOR SEIZURES:  Unclear.    RISK FACTORS FOR SEIZURES: History of developmental delay.  No history of head trauma with loss of consciousness or febrile convulsions.    Current Outpatient Medications   Medication Sig Dispense Refill     Calcium Carbonate Antacid (TUMS PO) Take by mouth as needed       cannabidiol (EPIDIOLEX) 100 MG/ML oral solution Take 1.82 mLs (182 mg) by mouth 2 times daily for 30 days, THEN 3.64 mLs (364 mg) 2 times daily. 250 mL 2     CLONAZEPAM PO Take 0.25 mg by mouth 2 times daily as needed        CLOZAPINE PO (CLOZARIL) Take 125 mg by mouth daily        Dextromethorphan-Guaifenesin (ROBITUSSIN COLD COUGH+ CHEST PO) Take by mouth as needed       famotidine (PEPCID) 20 MG tablet Take 20 mg by mouth At Bedtime.       fish oil-omega-3 fatty acids (OMEGA 3) 1000 MG capsule Take 2 g by mouth 2 times daily.       FLUoxetine (PROZAC) 40 MG capsule Take 40 mg by mouth 2 times daily.       ibuprofen (ADVIL,MOTRIN) 200 MG tablet Take 200 mg by mouth every 4 hours as needed for mild pain       lamoTRIgine (LAMICTAL) 200 MG tablet Take 1.5 tablets (300mg) by mouth three times a day. (Patient taking differently: 200 mg in the am 300 mg in the evening) 135 tablet 5     Loperamide-Simethicone (IMODIUM MULTI-SYMPTOM RELIEF) 2-125 MG CHEW Take by mouth as needed       LORazepam (LORAZEPAM INTENSOL) 2 MG/ML concentrated solution Give 1 ml (2mg) between cheek and gums for any seizure > 3 min or 2 seizures in 1 h or 3 seizures in 24 h. No more than 2 doses in 24 hours. 60 mL 0     Magnesium Hydroxide (MILK OF MAGNESIA PO) Take 2 teaspoonful by mouth as needed       OLANZAPINE PO Take 5 tablets by mouth 2 times daily 5 mg in the am, 10 mg in the evening       pramoxine (PRAX) 1 % LOTN lotion Place rectally 3 times daily       rufinamide (BANZEL) 400 MG tablet  Increase as instructed to 7 tabs (2800mg) orally every morning, and 8 tabs (3200mg) orally every evening. 450 tablet 11     senna-docusate (SENOKOT-S;PERICOLACE) 8.6-50 MG per tablet Take 2 tablets by mouth 2 times daily       Skin Protectants, Misc. (EUCERIN) cream Apply topically as needed for dry skin       topiramate (TOPAMAX) 25 MG capsule Take 100 mg by mouth 2 times daily         ALLERGIES:  No known drug allergies.    PAST MEDICAL HISTORY:  Symptomatic generalized epilepsy, developmental delay, obstructive sleep apnea.    PAST SURGICAL HISTORY:  None.    FAMILY HISTORY:  No family history of seizures.    SOCIAL HISTORY:  He lives in a group home.  No smoking, no alcohol, no drug abuse.  His guardian is Nadine Del Valle.    REVIEW OF SYSTEMS:  Unable to obtain.    PHYSICAL EXAMINATION:      Blood pressure 113/71, pulse 69, weight 160 lb 9.6 oz (72.8 kg).    General exam: General Appearance: No acute distress. HEENT: Normocephalic, atraumatic. Neck: Supple.  Extremities: No edema, no clubbing, no cyanosis.      NEUROLOGIC:  Alert, oriented x1. Follows most commands. Can say a few words, but not fluent.  No facial weakness.  Tongue and palate midline.  Pupils are equal, round and reactive to light.  Extraocular movements intact.  Motor exam: Moving all extremities.  Normal bulk, normal tone.  Strength probably 5/5 in all extremities.  Coordination:  No ataxia.  Sensory exam is grossly normal, although it is difficult to assess.  Gait: Normal casual gait.  No difficulties with tiptoe or heel walking.  He did not perform tandem gait because he did not understand.    PREVIOUS DIAGNOSTIC TESTING:  CT head on 02/22/2017 showed normal CT of the head.  Repeat CT head on 11/17/2020 showed no acute abnormalities.  EEG on 10/12/2010 showed generalized slowing of the background activities with bitemporal independent epileptiform activities.    IMPRESSION:    1.  Symptomatic generalized epilepsy, questionable Lennox-Gastaut  syndrome.  The patient continues to have frequent drop attacks about once every 4-6 weeks that sometimes cause injury in spite of the fact that he is taking 3 antiseizure medications with high doses.  I think it is reasonable to try Epidiolex to see if his seizures can be controlled better.  I also brought up the suggestion for surgical options, such as VNS and a corpus callosotomy, in the future if Epidiolex does not control his seizures better.  His group home staff will discuss with his guardian.  2.  Developmental delay.    PLAN:    1.  Continue Lamictal 200-300, rufinamide 2000 mg t.i.d. and Topamax 100 mg b.i.d.   2.  Start Epidiolex at 2.5 mg/kg per day b.i.d. for 1 month, then increase to 5 mg/kg per day b.i.d.  3.  We will discuss other options, such as VNS and corpus callosotomy, in the future with his guardian, Nadine Del Valle.  4.  Return to clinic in 3 months.      A total of 65 minutes was spent on this visit     40 minutes was spent in face-to-face time   10 minutes was spent on chart review and prescriptions  15 minutes was spent on documentation     Juan Luis Cruz MD        D: 2021   T: 2021   MT: magda    Name:     ADELINE LONG  MRN:      [id number]        Account:      [id number]   :      1975           Service Date: 2021       Document: [id number]

## 2021-08-09 NOTE — PATIENT INSTRUCTIONS
Times of Days am pm bedtime       Medication Tablet Size Number of Tablets/Capsules Total Daily Dosage    Epidiolex 100 mg/ml 5mg/kg 5mg/kg        360 mg    Lamictal 100 2 3       500 mg    Benzel 400 5 5 5     6000 mg    Topamax 100 1 1       200 mg                                                                             Carry this with you at all times.  CONTINUE TAKING YOUR OTHER MEDICATIONS AS PREVIOUSLY DIRECTED.      * * *Do not store medications in the bathroom.  Keep medications away from children!* * *

## 2021-08-10 ENCOUNTER — TELEPHONE (OUTPATIENT)
Dept: NEUROLOGY | Facility: CLINIC | Age: 46
End: 2021-08-10

## 2021-08-10 NOTE — TELEPHONE ENCOUNTER
Prior Authorization Retail Medication Request    Medication/Dose: cannabidiol (EPIDIOLEX) 100 MG/ML oral solution  ICD code (if different than what is on RX):    Previously Tried and Failed:    Rationale:      Insurance Name:    Insurance ID:        Pharmacy Information (if different than what is on RX)  Name:    Phone:

## 2021-08-11 NOTE — TELEPHONE ENCOUNTER
Central Prior Authorization Team   Phone: [phone]    PA Initiation    Medication: cannabidiol (EPIDIOLEX) 100 MG/ML oral solution  Insurance Company: [employer] - Phone [phone] Fax [phone]  Pharmacy Filling the Rx: [facility], [facility]. [place], [place] [facility] [address] [facility]  Filling Pharmacy Phone: [phone]  Filling Pharmacy Fax: [phone]  Start Date: 8/11/2021

## 2021-08-12 NOTE — TELEPHONE ENCOUNTER
Prior Authorization Approval    Authorization Effective Date: 8/11/2021  Authorization Expiration Date: 12/31/2021  Medication: cannabidiol (EPIDIOLEX) 100 MG/ML oral solution-PA APPROVED   Approved Dose/Quantity: QUANTITY UP TO 30 DAY SUPPLY   Reference #:     Insurance Company: [employer] - Phone [phone] Fax [phone]  Expected CoPay:       CoPay Card Available:      Foundation Assistance Needed:    Which Pharmacy is filling the prescription (Not needed for infusion/clinic administered): [facility]. [address]  Pharmacy Notified: Yes- Pharmacy stated that they do not have a current script on file and is requesting script with refills be sent to filling pharmacy at [facility] [address] **Instructed pharmacy to notify patient when script is ready to /ship.** Pharmacy stated that they will notify patient when medication is ready for .   Patient Notified: Yes

## 2021-11-04 DIAGNOSIS — G40.909 RECURRENT SEIZURES (H): ICD-10-CM

## 2021-11-08 ENCOUNTER — OFFICE VISIT (OUTPATIENT)
Dept: NEUROLOGY | Facility: CLINIC | Age: 46
End: 2021-11-08
Payer: MEDICAID

## 2021-11-08 VITALS
HEART RATE: 96 BPM | DIASTOLIC BLOOD PRESSURE: 69 MMHG | BODY MASS INDEX: 27.16 KG/M2 | SYSTOLIC BLOOD PRESSURE: 114 MMHG | TEMPERATURE: 98.5 F | WEIGHT: 168.2 LBS

## 2021-11-08 DIAGNOSIS — G40.909 RECURRENT SEIZURES (H): ICD-10-CM

## 2021-11-08 RX ORDER — CLOZAPINE 25 MG/1
25 TABLET ORAL DAILY
COMMUNITY

## 2021-11-08 RX ORDER — CANNABIDIOL 100 MG/ML
SOLUTION ORAL
Qty: 250 ML | Refills: 6 | Status: SHIPPED | OUTPATIENT
Start: 2021-11-08 | End: 2022-08-11

## 2021-11-08 RX ORDER — TACROLIMUS 1 MG/G
OINTMENT TOPICAL 2 TIMES DAILY
COMMUNITY

## 2021-11-08 NOTE — LETTER
"2021     RE: Sharona Man  : 1975   MRN: [id number]      Dear Colleague,    Thank you for referring your patient, Sharona Man, to the [facility] at [facility]. Please see a copy of my visit note below.    CHIEF COMPLAINT:  Follow up for epilepsy.    HISTORY OF PRESENT ILLNESS:  In person visit. This patient is a 45-year-old, right-handed male with history of intractable epilepsy and history of developmental delay who presents today to reestablish care for his epilepsy.  He is accompanied by his group home staff Roxy in the clinic today.     Since the last visit, he started on Epidiolex 5 mg/kg bid, 364 mg bid. No side effects were reported. He had no seizures for the past 3 months since Epidiolex started.  His group home staff and his guardian are very pleased with the results and would like to continue the status quo.    He was previously seen at the [facility] many years ago.  For the past 6 years, he was seen at [place].  One of the goals of the group home staff today is to see if the patient can start on Epidiolex to control his seizures better.  The patient's guardian is Nadine Del Valle; her telephone number is [phone].  The patient also has a psychiatrist, Ivelisse Arauz, at [phone].    The patient started to have seizures probably in early childhood.  His seizures have been poorly controlled for many years.  At the present time, he continues to have frequent seizures. According to the group home staff, he is now still having one drop seizure about once every 6 weeks.  The patient moved to the new group home about 6 months ago.    The patient is currently on Banzel 2000 mg t.i.d., Lamictal 200 mg in the morning and 300 mg in the evening and Topamax 100 mg twice daily.    The patient has 2 types of seizures. Type #1 is described as \"drop seizures.\"  These are most likely atonic seizures. He " will fall on the ground and shake. This will last for less than 2 minutes.  These are happening about once every 6 weeks at the present time.    Type #2 is probably generalized tonic-clonic seizures. These are infrequent.  The exact frequency is unclear at this time.  The group home staff thought they did not observe any for the past 6 months.    TRIGGERS FOR SEIZURES:  Unclear.    RISK FACTORS FOR SEIZURES: History of developmental delay.  No history of head trauma with loss of consciousness or febrile convulsions.    Current Outpatient Medications   Medication Sig Dispense Refill     Calcium Carbonate Antacid (TUMS PO) Take by mouth as needed       cannabidiol (EPIDIOLEX) 100 MG/ML oral solution Take 1.82 mLs (182 mg) by mouth 2 times daily for 30 days, THEN 3.64 mLs (364 mg) 2 times daily. 250 mL 2     CLONAZEPAM PO Take 0.25 mg by mouth 2 times daily as needed        CLOZAPINE PO (CLOZARIL) Take 125 mg by mouth daily        Dextromethorphan-Guaifenesin (ROBITUSSIN COLD COUGH+ CHEST PO) Take by mouth as needed       famotidine (PEPCID) 20 MG tablet Take 20 mg by mouth At Bedtime.       fish oil-omega-3 fatty acids (OMEGA 3) 1000 MG capsule Take 2 g by mouth 2 times daily.       FLUoxetine (PROZAC) 40 MG capsule Take 40 mg by mouth 2 times daily.       ibuprofen (ADVIL,MOTRIN) 200 MG tablet Take 200 mg by mouth every 4 hours as needed for mild pain       lamoTRIgine (LAMICTAL) 200 MG tablet Take 1.5 tablets (300mg) by mouth three times a day. (Patient taking differently: 200 mg in the am 300 mg in the evening) 135 tablet 5     Loperamide-Simethicone (IMODIUM MULTI-SYMPTOM RELIEF) 2-125 MG CHEW Take by mouth as needed       LORazepam (LORAZEPAM INTENSOL) 2 MG/ML concentrated solution Give 1 ml (2mg) between cheek and gums for any seizure > 3 min or 2 seizures in 1 h or 3 seizures in 24 h. No more than 2 doses in 24 hours. 60 mL 0     Magnesium Hydroxide (MILK OF MAGNESIA PO) Take 2 teaspoonful by mouth as needed        OLANZAPINE PO Take 5 tablets by mouth 2 times daily 5 mg in the am, 10 mg in the evening       pramoxine (PRAX) 1 % LOTN lotion Place rectally 3 times daily       rufinamide (BANZEL) 400 MG tablet Increase as instructed to 7 tabs (2800mg) orally every morning, and 8 tabs (3200mg) orally every evening. 450 tablet 11     senna-docusate (SENOKOT-S;PERICOLACE) 8.6-50 MG per tablet Take 2 tablets by mouth 2 times daily       Skin Protectants, Misc. (EUCERIN) cream Apply topically as needed for dry skin       topiramate (TOPAMAX) 25 MG capsule Take 100 mg by mouth 2 times daily         ALLERGIES:  No known drug allergies.    PAST MEDICAL HISTORY:  Symptomatic generalized epilepsy, developmental delay, obstructive sleep apnea.    PAST SURGICAL HISTORY:  None.    FAMILY HISTORY:  No family history of seizures.    SOCIAL HISTORY:  He lives in a group home.  No smoking, no alcohol, no drug abuse.  His guardian is Nadine Del Valle.    REVIEW OF SYSTEMS:  Unable to obtain.    PHYSICAL EXAMINATION:      Blood pressure 114/69, pulse 96, temperature 98.5  F (36.9  C), weight 76.3 kg (168 lb 3.2 oz).    General exam: General Appearance: No acute distress. HEENT: Normocephalic, atraumatic. Neck: Supple.  Extremities: No edema, no clubbing, no cyanosis.      NEUROLOGIC:  Alert, oriented x1. Follows most commands. Can say a few words, but not fluent.  No facial weakness.  Tongue and palate midline.  Extraocular movements intact.  Motor exam: Moving all extremities. Coordination:  No ataxia. Gait: Normal casual gait.    PREVIOUS DIAGNOSTIC TESTING:  CT head on 02/22/2017 showed normal CT of the head.  Repeat CT head on 11/17/2020 showed no acute abnormalities.  EEG on 10/12/2010 showed generalized slowing of the background activities with bitemporal independent epileptiform activities.    IMPRESSION:    1.  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  The patient had frequent drop attacks about once every  4-6 weeks in the past that sometimes cause injury in spite of the fact that he is taking 3 antiseizure medications with high doses.     Since the last visit, he started on Epidiolex 5 mg/kg bid, 364 mg bid. No side effects were reported. He had no seizures for the past 3 months since Epidiolex started.  His group home staff and his guardian are very pleased with the results and would like to continue the status quo.    2.  Developmental delay.    PLAN:    1.  Continue Lamictal 200-300, rufinamide 2000 mg t.i.d. and Topamax 100 mg b.i.d.   2.  Continue Epidiolex 5 mg/kg per day b.i.d. (3.64 ml, or 364 mg bid)  3.  We will discuss other options, such as VNS and corpus callosotomy, in the future with his guardian, Nadine Del Valle if seizures remains intractable.  4.  Return to clinic in 3 months.      33 min total time was spent on the day of this visit.    22 min was spent on face to face time  6 min was spent on preparation of visit to review charts and labs, ordering medications and tests  5 min was spent on documentation of clinical information    Again, thank you for allowing me to participate in the care of your patient.      Sincerely,    Juan Luis Cruz MD

## 2021-11-08 NOTE — PROGRESS NOTES
"CHIEF COMPLAINT:  Follow up for epilepsy.    HISTORY OF PRESENT ILLNESS:  In person visit. This patient is a 45-year-old, right-handed male with history of intractable epilepsy and history of developmental delay who presents today to reestablish care for his epilepsy.  He is accompanied by his group home staff Roxy in the clinic today.     Since the last visit, he started on Epidiolex 5 mg/kg bid, 364 mg bid. No side effects were reported. He had no seizures for the past 3 months since Epidiolex started.  His group home staff and his guardian are very pleased with the results and would like to continue the status quo.    He was previously seen at the [facility] many years ago.  For the past 6 years, he was seen at [place].  One of the goals of the group home staff today is to see if the patient can start on Epidiolex to control his seizures better.  The patient's guardian is Nadine Del Valle; her telephone number is [phone].  The patient also has a psychiatrist, Ivelisse Arauz, at [phone].    The patient started to have seizures probably in early childhood.  His seizures have been poorly controlled for many years.  At the present time, he continues to have frequent seizures. According to the group home staff, he is now still having one drop seizure about once every 6 weeks.  The patient moved to the new group home about 6 months ago.    The patient is currently on Banzel 2000 mg t.i.d., Lamictal 200 mg in the morning and 300 mg in the evening and Topamax 100 mg twice daily.    The patient has 2 types of seizures. Type #1 is described as \"drop seizures.\"  These are most likely atonic seizures. He will fall on the ground and shake. This will last for less than 2 minutes.  These are happening about once every 6 weeks at the present time.    Type #2 is probably generalized tonic-clonic seizures. These are infrequent.  The exact frequency is unclear at this time.  The group home staff thought they " did not observe any for the past 6 months.    TRIGGERS FOR SEIZURES:  Unclear.    RISK FACTORS FOR SEIZURES: History of developmental delay.  No history of head trauma with loss of consciousness or febrile convulsions.    Current Outpatient Medications   Medication Sig Dispense Refill     Calcium Carbonate Antacid (TUMS PO) Take by mouth as needed       cannabidiol (EPIDIOLEX) 100 MG/ML oral solution Take 1.82 mLs (182 mg) by mouth 2 times daily for 30 days, THEN 3.64 mLs (364 mg) 2 times daily. 250 mL 2     CLONAZEPAM PO Take 0.25 mg by mouth 2 times daily as needed        CLOZAPINE PO (CLOZARIL) Take 125 mg by mouth daily        Dextromethorphan-Guaifenesin (ROBITUSSIN COLD COUGH+ CHEST PO) Take by mouth as needed       famotidine (PEPCID) 20 MG tablet Take 20 mg by mouth At Bedtime.       fish oil-omega-3 fatty acids (OMEGA 3) 1000 MG capsule Take 2 g by mouth 2 times daily.       FLUoxetine (PROZAC) 40 MG capsule Take 40 mg by mouth 2 times daily.       ibuprofen (ADVIL,MOTRIN) 200 MG tablet Take 200 mg by mouth every 4 hours as needed for mild pain       lamoTRIgine (LAMICTAL) 200 MG tablet Take 1.5 tablets (300mg) by mouth three times a day. (Patient taking differently: 200 mg in the am 300 mg in the evening) 135 tablet 5     Loperamide-Simethicone (IMODIUM MULTI-SYMPTOM RELIEF) 2-125 MG CHEW Take by mouth as needed       LORazepam (LORAZEPAM INTENSOL) 2 MG/ML concentrated solution Give 1 ml (2mg) between cheek and gums for any seizure > 3 min or 2 seizures in 1 h or 3 seizures in 24 h. No more than 2 doses in 24 hours. 60 mL 0     Magnesium Hydroxide (MILK OF MAGNESIA PO) Take 2 teaspoonful by mouth as needed       OLANZAPINE PO Take 5 tablets by mouth 2 times daily 5 mg in the am, 10 mg in the evening       pramoxine (PRAX) 1 % LOTN lotion Place rectally 3 times daily       rufinamide (BANZEL) 400 MG tablet Increase as instructed to 7 tabs (2800mg) orally every morning, and 8 tabs (3200mg) orally every  evening. 450 tablet 11     senna-docusate (SENOKOT-S;PERICOLACE) 8.6-50 MG per tablet Take 2 tablets by mouth 2 times daily       Skin Protectants, Misc. (EUCERIN) cream Apply topically as needed for dry skin       topiramate (TOPAMAX) 25 MG capsule Take 100 mg by mouth 2 times daily         ALLERGIES:  No known drug allergies.    PAST MEDICAL HISTORY:  Symptomatic generalized epilepsy, developmental delay, obstructive sleep apnea.    PAST SURGICAL HISTORY:  None.    FAMILY HISTORY:  No family history of seizures.    SOCIAL HISTORY:  He lives in a group home.  No smoking, no alcohol, no drug abuse.  His guardian is Nadine Del Valle.    REVIEW OF SYSTEMS:  Unable to obtain.    PHYSICAL EXAMINATION:      Blood pressure 114/69, pulse 96, temperature 98.5  F (36.9  C), weight 76.3 kg (168 lb 3.2 oz).    General exam: General Appearance: No acute distress. HEENT: Normocephalic, atraumatic. Neck: Supple.  Extremities: No edema, no clubbing, no cyanosis.      NEUROLOGIC:  Alert, oriented x1. Follows most commands. Can say a few words, but not fluent.  No facial weakness.  Tongue and palate midline.  Extraocular movements intact.  Motor exam: Moving all extremities. Coordination:  No ataxia. Gait: Normal casual gait.    PREVIOUS DIAGNOSTIC TESTING:  CT head on 02/22/2017 showed normal CT of the head.  Repeat CT head on 11/17/2020 showed no acute abnormalities.  EEG on 10/12/2010 showed generalized slowing of the background activities with bitemporal independent epileptiform activities.    IMPRESSION:    1.  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  The patient had frequent drop attacks about once every 4-6 weeks in the past that sometimes cause injury in spite of the fact that he is taking 3 antiseizure medications with high doses.     Since the last visit, he started on Epidiolex 5 mg/kg bid, 364 mg bid. No side effects were reported. He had no seizures for the past 3 months since Epidiolex  started.  His group home staff and his guardian are very pleased with the results and would like to continue the status quo.    2.  Developmental delay.    PLAN:    1.  Continue Lamictal 200-300, rufinamide 2000 mg t.i.d. and Topamax 100 mg b.i.d.   2.  Continue Epidiolex 5 mg/kg per day b.i.d. (3.64 ml, or 364 mg bid)  3.  We will discuss other options, such as VNS and corpus callosotomy, in the future with his guardian, Nadine Del Valle if seizures remains intractable.  4.  Return to clinic in 3 months.      33 min total time was spent on the day of this visit.      22 min was spent on face to face time  6 min was spent on preparation of visit to review charts and labs, ordering medications and tests  5 min was spent on documentation of clinical information

## 2021-11-11 RX ORDER — CANNABIDIOL 100 MG/ML
SOLUTION ORAL
Qty: 200 ML | Refills: 11 | Status: SHIPPED | OUTPATIENT
Start: 2021-11-11 | End: 2022-08-11

## 2021-12-17 ENCOUNTER — TELEPHONE (OUTPATIENT)
Dept: NEUROLOGY | Facility: CLINIC | Age: 46
End: 2021-12-17
Payer: MEDICARE

## 2021-12-17 NOTE — TELEPHONE ENCOUNTER
Prior Authorization Specialty Medication Request    Medication/Dose: Epidiolex 100mg/ml  ICD code (if different than what is on RX):  Recurrent seizures (H) [G40.909]  Previously Tried and Failed:      Important Lab Values:   Rationale:     Insurance Name: [place]  Insurance ID: [id number]   Insurance Phone Number:   Payor: 16-[place]ICAID [place] Ph: [phone]     Benefit plan: [facility]-[place] Ph: [phone]         Pharmacy Information (if different than what is on RX)  Name:    Phone:

## 2021-12-21 NOTE — TELEPHONE ENCOUNTER
Prior Authorization Not Needed per Insurance        Medication: Epidiolex 100mg/ml-PA NOT NEEDED   Insurance Company: [employer] - Phone [phone] Fax [phone]  Expected CoPay:      Pharmacy Filling the Rx: [facility], [employer] - [place], [place] - [address] [address]  Pharmacy Notified: Yes  Patient Notified: No    Called pharmacy and pharmacy stated that PA is Not Needed and medication is covered. Pharmacy stated that medication has been billed to patients Medicare Part D Human. Kristian at pharmacy stated that they have a paid claim on medication and patient will be notify when medication is ready by 12/23/2021. Insurance also stated that PA is Not Needed and medication is covered.

## 2022-02-07 ENCOUNTER — OFFICE VISIT (OUTPATIENT)
Dept: NEUROLOGY | Facility: CLINIC | Age: 47
End: 2022-02-07
Payer: MEDICAID

## 2022-02-07 VITALS
HEIGHT: 68 IN | WEIGHT: 161.4 LBS | HEART RATE: 87 BPM | SYSTOLIC BLOOD PRESSURE: 114 MMHG | BODY MASS INDEX: 24.46 KG/M2 | TEMPERATURE: 97.3 F | DIASTOLIC BLOOD PRESSURE: 70 MMHG

## 2022-02-07 DIAGNOSIS — G40.909 RECURRENT SEIZURES (H): Primary | ICD-10-CM

## 2022-02-07 RX ORDER — OLANZAPINE 5 MG/1
1 TABLET ORAL DAILY
COMMUNITY
Start: 2020-11-13

## 2022-02-07 RX ORDER — LAMOTRIGINE 100 MG/1
TABLET ORAL
COMMUNITY
Start: 2021-05-24 | End: 2022-08-11

## 2022-02-07 ASSESSMENT — MIFFLIN-ST. JEOR: SCORE: 1568.67

## 2022-02-07 NOTE — PROGRESS NOTES
CHIEF COMPLAINT:  Follow up for epilepsy.    HISTORY OF PRESENT ILLNESS:  In person visit. This patient is a 46-year-old, right-handed male with history of intractable epilepsy and history of developmental delay who presents for follow-up visit.  He is accompanied by his group home nurse, Roxy in the clinic today.     He was last seen 3 months ago, at which time he no seizures in the prior 3 months after he initiated Epidiolex 5 mg/kg bid, 364 mg bid and no changes were made to his medications. His other medications include Lamictal 200-300, rufinamide 2000 mg t.i.d. and Topamax 100 mg b.i.d.     Since last visit, no seizures - no drop attacks and no generalized tonic clonic seizures. Roxy is not sure if smaller, subtler seizures would be identified. Overall, [facility] is happy with seizure control. No issues with the medications, no diarrhea or stomach pains. No other side effects of concern to Roxy, but his guardian was interested to know if we can reduce other anti-seizure medications.     No interval health issues.     He was previously seen at the [facility] many years ago.  For the past 6 years, he was seen at [place].  One of the goals of the group home staff today is to see if the patient can start on Epidiolex to control his seizures better.  The patient's guardian is Nadine Del Valle; her telephone number is [phone].  The patient also has a psychiatrist, Ivelisse Arauz, at [phone].    The patient started to have seizures probably in early childhood.  His seizures have been poorly controlled for many years.  At the present time, he continues to have frequent seizures. According to the group home staff, he is now still having one drop seizure about once every 6 weeks.  The patient moved to the new group [place] about 6 months ago.    The patient is currently on Banzel 2000 mg t.i.d., Lamictal 200 mg in the morning and 300 mg in the evening and Topamax 100 mg twice daily.    The patient  "has 2 types of seizures. Type #1 is described as \"drop seizures.\"  These are most likely atonic seizures. He will fall on the ground and shake. This will last for less than 2 minutes.  These are happening about once every 6 weeks at the present time.    Type #2 is probably generalized tonic-clonic seizures. These are infrequent.  The exact frequency is unclear at this time.  The group home staff thought they did not observe any for the past 6 months.    TRIGGERS FOR SEIZURES:  Unclear.    RISK FACTORS FOR SEIZURES: History of developmental delay.  No history of head trauma with loss of consciousness or febrile convulsions.    Current Outpatient Medications   Medication Sig Dispense Refill     CLONAZEPAM PO Take 0.25 mg by mouth 2 times daily as needed        cloZAPine (CLOZARIL) 25 MG tablet Take 25 mg by mouth daily 2 tablets at bedtime       CLOZAPINE PO (CLOZARIL) Take 100 mg by mouth At Bedtime 100 mg at bedtime       EPIDIOLEX 100 MG/ML oral solution TAKE 3.64ML (364MG) BY MOUTH TWICE DAILY. **DISPENSE FULL BOTTLE IN ORIGINAL CONTAINER** 200 mL 11     famotidine (PEPCID) 20 MG tablet Take 20 mg by mouth At Bedtime.       fish oil-omega-3 fatty acids (OMEGA 3) 1000 MG capsule Take 2 g by mouth 3 times daily        FLUoxetine (PROZAC) 40 MG capsule Take 40 mg by mouth 2 times daily.       lamoTRIgine (LAMICTAL) 100 MG tablet 2 tablets in the morning and 3 tablets in the evening       lamoTRIgine (LAMICTAL) 200 MG tablet Take 1.5 tablets (300mg) by mouth three times a day. 135 tablet 5     OLANZapine (ZYPREXA) 5 MG tablet Take 1 tablet by mouth daily       OLANZAPINE PO Take 10 mg by mouth At Bedtime Take 1 tab po at bedtime       rufinamide (BANZEL) 400 MG tablet Increase as instructed to 7 tabs (2800mg) orally every morning, and 8 tabs (3200mg) orally every evening. (Patient taking differently: Pt currently taking 5 tablets (2000 mg) by mouth three times a day) 450 tablet 11     tacrolimus (PROTOPIC) 0.1 % " external ointment Apply topically 2 times daily       topiramate (TOPAMAX) 25 MG capsule Take 50 mg by mouth 2 times daily Take two tablets twice a day       Calcium Carbonate Antacid (TUMS PO) Take by mouth as needed (Patient not taking: Reported on 2/7/2022)       cannabidiol (EPIDIOLEX) 100 MG/ML oral solution Take 5 mg/kg bid (3.64 ml twice daily) (Patient not taking: Reported on 2/7/2022) 250 mL 6     Dextromethorphan-Guaifenesin (ROBITUSSIN COLD COUGH+ CHEST PO) Take by mouth as needed (Patient not taking: Reported on 2/7/2022)       Emollient (CERAVE DAILY MOISTURIZING EX)  (Patient not taking: Reported on 2/7/2022)       ibuprofen (ADVIL,MOTRIN) 200 MG tablet Take 200 mg by mouth every 4 hours as needed for mild pain       Loperamide-Simethicone (IMODIUM MULTI-SYMPTOM RELIEF) 2-125 MG CHEW Take by mouth as needed (Patient not taking: Reported on 2/7/2022)       LORazepam (LORAZEPAM INTENSOL) 2 MG/ML concentrated solution Give 1 ml (2mg) between cheek and gums for any seizure > 3 min or 2 seizures in 1 h or 3 seizures in 24 h. No more than 2 doses in 24 hours. (Patient not taking: Reported on 2/7/2022) 60 mL 0     Magnesium Hydroxide (MILK OF MAGNESIA PO) Take 2 teaspoonful by mouth as needed (Patient not taking: Reported on 2/7/2022)       pramoxine (PRAX) 1 % LOTN lotion Place rectally 3 times daily (Patient not taking: Reported on 2/7/2022)       senna-docusate (SENOKOT-S;PERICOLACE) 8.6-50 MG per tablet Take 2 tablets by mouth 2 times daily       Skin Protectants, Misc. (EUCERIN) cream Apply topically as needed for dry skin       ALLERGIES:  No known drug allergies.    PAST MEDICAL HISTORY:  Symptomatic generalized epilepsy, developmental delay, obstructive sleep apnea.    PAST SURGICAL HISTORY:  None.    FAMILY HISTORY:  No family history of seizures.    SOCIAL HISTORY:  He lives in a group home.  No smoking, no alcohol, no drug abuse.  His guardian is Nadine Del Valle.    REVIEW OF SYSTEMS:  Unable to  "obtain.    PHYSICAL EXAMINATION:      Blood pressure 114/70, pulse 87, temperature 97.3  F (36.3  C), temperature source Temporal, height 5' 7.5\" (171.5 cm), weight 161 lb 6.4 oz (73.2 kg).    General exam: General Appearance: No acute distress. HEENT: Normocephalic, atraumatic.      NEUROLOGIC:  Alert, attentive, and vocalizes when spoken to, but does not answer questions. He spoke no words today. He follows all simple commands upon mimicking action, but not consistently with verbal instructions and no complex commands (tandem, pronator with eyes closed)  No facial weakness. Extraocular movements intact.  Motor exam: Moving all extremities, no tremor on outstretched arms. Coordination:  No ataxia with FNF. Gait: Normal casual gait. Difficulty with instruction for tandem.     PREVIOUS DIAGNOSTIC TESTING:  CT head on 02/22/2017 showed normal CT of the head.  Repeat CT head on 11/17/2020 showed no acute abnormalities.  EEG on 10/12/2010 showed generalized slowing of the background activities with bitemporal independent epileptiform activities.    IMPRESSION:    1.  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 The patient had frequent drop attacks about once every 4-6 weeks in the past that sometimes cause injury in spite of the fact that he is taking 3 antiseizure medications with high doses. Epidiolex 5 mg/kg bid, 364 mg bid is the most recent medication added and patient has been seizure free for the past ~ 6 months after initiation. No side effects were reported.     His group home staff and his guardian are very pleased with the results and would like to continue the current regimen. Guardian inquired about reduction in other anti-seizure medications. Given his seizure freedom, particularly from seizures that can cause injury (drop attacks and GTCs) and lack of side effects, we think he should continue his anti-seizure medications with no changes.    2.  Developmental delay.    PLAN:  "   1.  Continue Lamictal 200-300, rufinamide 2000 mg t.i.d, Topamax 100 mg b.i.d and epidiolex 5 mg/kg per day b.i.d. (3.64 ml, or 364 mg bid)  2.  Clinical labs (anti-seizure levels and hepatic function)   3.  We will discuss other options, such as VNS and corpus callosotomy, in the future with his guardian, Nadine Del Valle if seizures remains intractable.  4.  Return to clinic in 6 months.    Patient was seen and evaluated with attending, Dr. Cruz.     Brenda Beach MD  Epilepsy Fellow

## 2022-02-07 NOTE — LETTER
2022     RE: Sharona Man  : 1975   MRN: [id number]      Dear Colleague,    Thank you for referring your patient, Sharona Man, to the [facility]PI[facility] at [facility]. Please see a copy of my visit note below.    CHIEF COMPLAINT:  Follow up for epilepsy.    HISTORY OF PRESENT ILLNESS:  In person visit. This patient is a 46-year-old, right-handed male with history of intractable epilepsy and history of developmental delay who presents for follow-up visit.  He is accompanied by his group home nurse, Roxy in the clinic today.     He was last seen 3 months ago, at which time he no seizures in the prior 3 months after he initiated Epidiolex 5 mg/kg bid, 364 mg bid and no changes were made to his medications. His other medications include Lamictal 200-300, rufinamide 2000 mg t.i.d. and Topamax 100 mg b.i.d.     Since last visit, no seizures - no drop attacks and no generalized tonic clonic seizures. Roxy is not sure if smaller, subtler seizures would be identified. Overall, [facility] is happy with seizure control. No issues with the medications, no diarrhea or stomach pains. No other side effects of concern to Roxy, but his guardian was interested to know if we can reduce other anti-seizure medications.     No interval health issues.     He was previously seen at the [facility] many years ago.  For the past 6 years, he was seen at [facility].  One of the goals of the group [facility] staff today is to see if the patient can start on Epidiolex to control his seizures better.  The patient's guardian is Nadine Del Valle; her telephone number is [phone].  The patient also has a psychiatrist, Ivelisse Arauz, at [phone].    The patient started to have seizures probably in early childhood.  His seizures have been poorly controlled for many years.  At the present time, he continues to have frequent seizures. According to  "the group home staff, he is now still having one drop seizure about once every 6 weeks.  The patient moved to the new group home about 6 months ago.    The patient is currently on Banzel 2000 mg t.i.d., Lamictal 200 mg in the morning and 300 mg in the evening and Topamax 100 mg twice daily.    The patient has 2 types of seizures. Type #1 is described as \"drop seizures.\"  These are most likely atonic seizures. He will fall on the ground and shake. This will last for less than 2 minutes.  These are happening about once every 6 weeks at the present time.    Type #2 is probably generalized tonic-clonic seizures. These are infrequent.  The exact frequency is unclear at this time.  The group home staff thought they did not observe any for the past 6 months.    TRIGGERS FOR SEIZURES:  Unclear.    RISK FACTORS FOR SEIZURES: History of developmental delay.  No history of head trauma with loss of consciousness or febrile convulsions.    Current Outpatient Medications   Medication Sig Dispense Refill     CLONAZEPAM PO Take 0.25 mg by mouth 2 times daily as needed        cloZAPine (CLOZARIL) 25 MG tablet Take 25 mg by mouth daily 2 tablets at bedtime       CLOZAPINE PO (CLOZARIL) Take 100 mg by mouth At Bedtime 100 mg at bedtime       EPIDIOLEX 100 MG/ML oral solution TAKE 3.64ML (364MG) BY MOUTH TWICE DAILY. **DISPENSE FULL BOTTLE IN ORIGINAL CONTAINER** 200 mL 11     famotidine (PEPCID) 20 MG tablet Take 20 mg by mouth At Bedtime.       fish oil-omega-3 fatty acids (OMEGA 3) 1000 MG capsule Take 2 g by mouth 3 times daily        FLUoxetine (PROZAC) 40 MG capsule Take 40 mg by mouth 2 times daily.       lamoTRIgine (LAMICTAL) 100 MG tablet 2 tablets in the morning and 3 tablets in the evening       lamoTRIgine (LAMICTAL) 200 MG tablet Take 1.5 tablets (300mg) by mouth three times a day. 135 tablet 5     OLANZapine (ZYPREXA) 5 MG tablet Take 1 tablet by mouth daily       OLANZAPINE PO Take 10 mg by mouth At Bedtime Take 1 tab " po at bedtime       rufinamide (BANZEL) 400 MG tablet Increase as instructed to 7 tabs (2800mg) orally every morning, and 8 tabs (3200mg) orally every evening. (Patient taking differently: Pt currently taking 5 tablets (2000 mg) by mouth three times a day) 450 tablet 11     tacrolimus (PROTOPIC) 0.1 % external ointment Apply topically 2 times daily       topiramate (TOPAMAX) 25 MG capsule Take 50 mg by mouth 2 times daily Take two tablets twice a day       Calcium Carbonate Antacid (TUMS PO) Take by mouth as needed (Patient not taking: Reported on 2/7/2022)       cannabidiol (EPIDIOLEX) 100 MG/ML oral solution Take 5 mg/kg bid (3.64 ml twice daily) (Patient not taking: Reported on 2/7/2022) 250 mL 6     Dextromethorphan-Guaifenesin (ROBITUSSIN COLD COUGH+ CHEST PO) Take by mouth as needed (Patient not taking: Reported on 2/7/2022)       Emollient (CERAVE DAILY MOISTURIZING EX)  (Patient not taking: Reported on 2/7/2022)       ibuprofen (ADVIL,MOTRIN) 200 MG tablet Take 200 mg by mouth every 4 hours as needed for mild pain       Loperamide-Simethicone (IMODIUM MULTI-SYMPTOM RELIEF) 2-125 MG CHEW Take by mouth as needed (Patient not taking: Reported on 2/7/2022)       LORazepam (LORAZEPAM INTENSOL) 2 MG/ML concentrated solution Give 1 ml (2mg) between cheek and gums for any seizure > 3 min or 2 seizures in 1 h or 3 seizures in 24 h. No more than 2 doses in 24 hours. (Patient not taking: Reported on 2/7/2022) 60 mL 0     Magnesium Hydroxide (MILK OF MAGNESIA PO) Take 2 teaspoonful by mouth as needed (Patient not taking: Reported on 2/7/2022)       pramoxine (PRAX) 1 % LOTN lotion Place rectally 3 times daily (Patient not taking: Reported on 2/7/2022)       senna-docusate (SENOKOT-S;PERICOLACE) 8.6-50 MG per tablet Take 2 tablets by mouth 2 times daily       Skin Protectants, Misc. (EUCERIN) cream Apply topically as needed for dry skin       ALLERGIES:  No known drug allergies.    PAST MEDICAL HISTORY:  Symptomatic  "generalized epilepsy, developmental delay, obstructive sleep apnea.    PAST SURGICAL HISTORY:  None.    FAMILY HISTORY:  No family history of seizures.    SOCIAL HISTORY:  He lives in a group home.  No smoking, no alcohol, no drug abuse.  His guardian is Nadine Del Valle.    REVIEW OF SYSTEMS:  Unable to obtain.    PHYSICAL EXAMINATION:      Blood pressure 114/70, pulse 87, temperature 97.3  F (36.3  C), temperature source Temporal, height 5' 7.5\" (171.5 cm), weight 161 lb 6.4 oz (73.2 kg).    General exam: General Appearance: No acute distress. HEENT: Normocephalic, atraumatic.      NEUROLOGIC:  Alert, attentive, and vocalizes when spoken to, but does not answer questions. He spoke no words today. He follows all simple commands upon mimicking action, but not consistently with verbal instructions and no complex commands (tandem, pronator with eyes closed)  No facial weakness. Extraocular movements intact.  Motor exam: Moving all extremities, no tremor on outstretched arms. Coordination:  No ataxia with FNF. Gait: Normal casual gait. Difficulty with instruction for tandem.     PREVIOUS DIAGNOSTIC TESTING:  CT head on 02/22/2017 showed normal CT of the head.  Repeat CT head on 11/17/2020 showed no acute abnormalities.  EEG on 10/12/2010 showed generalized slowing of the background activities with bitemporal independent epileptiform activities.    IMPRESSION:    1.  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 The patient had frequent drop attacks about once every 4-6 weeks in the past that sometimes cause injury in spite of the fact that he is taking 3 antiseizure medications with high doses. Epidiolex 5 mg/kg bid, 364 mg bid is the most recent medication added and patient has been seizure free for the past ~ 6 months after initiation. No side effects were reported.     His group home staff and his guardian are very pleased with the results and would like to continue the current regimen. " Guardian inquired about reduction in other anti-seizure medications. Given his seizure freedom, particularly from seizures that can cause injury (drop attacks and GTCs) and lack of side effects, we think he should continue his anti-seizure medications with no changes.    2.  Developmental delay.    PLAN:    1.  Continue Lamictal 200-300, rufinamide 2000 mg t.i.d, Topamax 100 mg b.i.d and epidiolex 5 mg/kg per day b.i.d. (3.64 ml, or 364 mg bid)  2.  Clinical labs (anti-seizure levels and hepatic function)   3.  We will discuss other options, such as VNS and corpus callosotomy, in the future with his guardian, Nadine Del Valle if seizures remains intractable.  4.  Return to clinic in 6 months.    Patient was seen and evaluated with attending, Dr. Cruz.     Brenda Beach MD  Epilepsy Fellow       Attestation signed by Juan Luis Cruz MD at 2/8/2022 11:18 AM:  Neurology Attending Note:    I have seen and examined the patient with Dr. Vann.  I have reviewed the labs and imaging results available.  I agree with the assessment and plan.    Juan Luis Cruz MD

## 2022-03-25 DIAGNOSIS — G40.309 GENERALIZED TONIC CLONIC EPILEPSY (H): ICD-10-CM

## 2022-03-29 RX ORDER — RUFINAMIDE 400 MG/1
TABLET, FILM COATED ORAL
Qty: 465 TABLET | Refills: 11 | Status: SHIPPED | OUTPATIENT
Start: 2022-03-29 | End: 2022-08-11

## 2022-03-29 NOTE — TELEPHONE ENCOUNTER
Rufinamide 400 MG Tablet   Last Written Prescription Date:   6/11/2015  Last Fill Quantity: 450,   # refills: 11  Last Office Visit :  2/7/2022  Future Office visit:   8/11/2022    Routing refill request to provider for review/approval because:  Gaps in refills.   Last filled June 2015??  Refer to Provider for review and refills per Providers recommendations.       Porsha Pope RN  Central Triage Red Flags/Med Refills

## 2022-04-29 ENCOUNTER — TELEPHONE (OUTPATIENT)
Dept: NEUROLOGY | Facility: CLINIC | Age: 47
End: 2022-04-29
Payer: MEDICARE

## 2022-04-29 NOTE — TELEPHONE ENCOUNTER
Prior Authorization Retail Medication Request    Medication/Dose: Rufinamide 400 mg  ICD code (if different than what is on RX):      Previously Tried and Failed:    Rationale:      Insurance Name:    Insurance ID:        Pharmacy Information (if different than what is on RX)  Name:    Phone:

## 2022-05-04 NOTE — TELEPHONE ENCOUNTER
Central Prior Authorization Team   Phone: [phone]    PA Initiation    Medication: Rufinamide 400 mg  Insurance Company: [employer] - Phone [phone] Fax [phone]  Pharmacy Filling the Rx: [facility], [facility]. - [place], [place] - [address] [address]  Filling Pharmacy Phone: [phone]  Filling Pharmacy Fax: [phone]  Start Date: 5/4/2022

## 2022-05-09 NOTE — TELEPHONE ENCOUNTER
Prior Authorization Approval    Authorization Effective Date: 5/4/2022  Authorization Expiration Date: 12/31/2022  Medication: Rufinamide 400 mg-PA APPROVED   Approved Dose/Quantity: APPROVED FOR 30 DAY SUPPLY   Reference #:     Insurance Company: [employer] [phone] Fax [phone]  Expected CoPay:       CoPay Card Available:      Foundation Assistance Needed:    Which Pharmacy is filling the prescription (Not needed for infusion/clinic administered): [facility] [employer]. - [place], [place] - [address] [address]  Pharmacy Notified: Yes- **Instructed pharmacy to notify patient when script is ready to /ship.**  Patient Notified: Yes

## 2022-08-11 ENCOUNTER — VIRTUAL VISIT (OUTPATIENT)
Dept: NEUROLOGY | Facility: CLINIC | Age: 47
End: 2022-08-11
Payer: MEDICAID

## 2022-08-11 VITALS — WEIGHT: 159 LBS | BODY MASS INDEX: 24.54 KG/M2

## 2022-08-11 DIAGNOSIS — G40.909 RECURRENT SEIZURES (H): ICD-10-CM

## 2022-08-11 DIAGNOSIS — G40.309 GENERALIZED TONIC CLONIC EPILEPSY (H): ICD-10-CM

## 2022-08-11 RX ORDER — RUFINAMIDE 400 MG/1
TABLET, FILM COATED ORAL
Qty: 465 TABLET | Refills: 11 | Status: SHIPPED | OUTPATIENT
Start: 2022-08-11 | End: 2023-02-09

## 2022-08-11 RX ORDER — TOPIRAMATE SPINKLE 25 MG/1
50 CAPSULE ORAL 2 TIMES DAILY
Qty: 120 CAPSULE | Refills: 11 | Status: SHIPPED | OUTPATIENT
Start: 2022-08-11 | End: 2022-08-12

## 2022-08-11 RX ORDER — LAMOTRIGINE 100 MG/1
TABLET ORAL
Qty: 150 TABLET | Refills: 11 | Status: SHIPPED | OUTPATIENT
Start: 2022-08-11 | End: 2023-02-09

## 2022-08-11 RX ORDER — CANNABIDIOL 100 MG/ML
SOLUTION ORAL
Qty: 200 ML | Refills: 11 | Status: SHIPPED | OUTPATIENT
Start: 2022-08-11 | End: 2023-02-09

## 2022-08-11 ASSESSMENT — PAIN SCALES - GENERAL: PAINLEVEL: NO PAIN (0)

## 2022-08-11 NOTE — PROGRESS NOTES
"CHIEF COMPLAINT:  Follow up for epilepsy.    HISTORY OF PRESENT ILLNESS:  Video call for follow up. This patient is a 46-year-old, right-handed male with history of intractable epilepsy and history of developmental delay.  He is accompanied by his group home staff Marilyn during the video call.     Since the last visit, he had no seizures.  He started on Epidiolex 5 mg/kg bid, 364 mg bid since 2021. No side effects were reported. He had no seizures since Epidiolex started.  He is currently taking Rufinamide 2000 mg tid, Topamax 50 mg bid, Lamictal 200-300, Epidionex 364 mg bid. His group home staff and his guardian are very pleased with the results and would like to continue the status quo.     He was previously seen at the [facility] many years ago.  For the past 6 years, he was seen at [facility].  One of the goals of the group home staff today is to see if the patient can start on Epidiolex to control his seizures better.  The patient's guardian is Nadine Del Valle; her telephone number is [phone].  The patient also has a psychiatrist, Ivelisse Arauz, at [phone].    The patient started to have seizures probably in early childhood.  His seizures have been poorly controlled for many years.  At the present time, he continues to have frequent seizures. According to the group home staff, he is now still having one drop seizure about once every 6 weeks.  The patient moved to the new group home about 6 months ago.    The patient is currently on Banzel 2000 mg t.i.d., Lamictal 200 mg in the morning and 300 mg in the evening and Topamax 100 mg twice daily.    The patient has 2 types of seizures. Type #1 is described as \"drop seizures.\"  These are most likely atonic seizures. He will fall on the ground and shake. This will last for less than 2 minutes.  These are happening about once every 6 weeks at the present time.    Type #2 is probably generalized tonic-clonic seizures. These are infrequent.  The exact " frequency is unclear at this time.  The group home staff thought they did not observe any for the past 6 months.    TRIGGERS FOR SEIZURES:  Unclear.    RISK FACTORS FOR SEIZURES: History of developmental delay.  No history of head trauma with loss of consciousness or febrile convulsions.      ALLERGIES:  No known drug allergies.    PAST MEDICAL HISTORY:  Symptomatic generalized epilepsy, developmental delay, obstructive sleep apnea.    PAST SURGICAL HISTORY:  None.    FAMILY HISTORY:  No family history of seizures.    SOCIAL HISTORY:  He lives in a group home.  No smoking, no alcohol, no drug abuse.  His guardian is Nadine Del Valle.    REVIEW OF SYSTEMS:  Unable to obtain.    PHYSICAL EXAMINATION:      Weight 159 lb (72.1 kg).    Alert, oriented to name only. Not to place or time. Very few owrds.    IMPRESSION:    1.  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  The patient had frequent drop attacks about once every 4-6 weeks in the past that sometimes cause injury in spite of the fact that he is taking 3 antiseizure medications with high doses.     Since the last visit, he had no seizures.  He started on Epidiolex 5 mg/kg bid, 364 mg bid since 2021. No side effects were reported. He had no seizures since Epidiolex started.  He is currently taking Rufinamide 2000 mg tid, Topamax 50 mg bid, Lamictal 200-300, Epidionex 364 mg bid.     It seems that Epidiolex works extremely well for Salo.    2.  Developmental delay.    PLAN:    1.  Continue Lamictal 200-300, rufinamide 2000 mg t.i.d. and Topamax 100 mg b.i.d.   2.  Continue Epidiolex 5 mg/kg per day b.i.d. (3.64 ml, or 364 mg bid)  3.  We will discuss other options, such as VNS and corpus callosotomy, in the future with his guardian, Nadine Del Valle if seizures remains intractable.  4.  Return to clinic in 6 months.      23 min total time was spent on the day of this visit.      14 min was spent on face to face time  4 min was spent on  preparation of visit to review charts and labs, ordering medications and tests  5 min was spent on documentation of clinical information

## 2022-08-11 NOTE — LETTER
2022       RE: Sharona Man  : 1975   MRN: [id number]      Dear Colleague,    Thank you for referring your patient, Sharona Man, to the [facility] at [facility]. Please see a copy of my visit note below.    Salo is a 46 year old who is being evaluated via a billable video visit.      How would you like to obtain your AVS? Mail a copy  If the video visit is dropped, the invitation should be resent by: Other e-mail: [email or web address]  Will anyone else be joining your video visit? Yes,  Marilyn from the group home will be joining.         Video-Visit Details    Video Start Time: 10:35 AM    Type of service:  Video Visit    Video End Time:10:49 AM    Originating Location (pt. Location): Home    Distant Location (provider location):  [facility] EPILE[facility]     Platform used for Video Visit: [employer]       Allergies have been reviewed and a medication list will be faxed over to be reviewed.      LOUANN Merritt    CHIEF COMPLAINT:  Follow up for epilepsy.    HISTORY OF PRESENT ILLNESS:  Video call for follow up. This patient is a 46-year-old, right-handed male with history of intractable epilepsy and history of developmental delay.  He is accompanied by his group home staff Marilyn during the video call.     Since the last visit, he had no seizures.  He started on Epidiolex 5 mg/kg bid, 364 mg bid since . No side effects were reported. He had no seizures since Epidiolex started.  He is currently taking Rufinamide 2000 mg tid, Topamax 50 mg bid, Lamictal 200-300, Epidionex 364 mg bid. His group home staff and his guardian are very pleased with the results and would like to continue the status quo.     He was previously seen at the [facility] many years ago.  For the past 6 years, he was seen at [facility] [place].  One of the goals of the group home staff today is to see if the patient can start on  "Epidiolex to control his seizures better.  The patient's guardian is Nadine Del Valle; her telephone number is [phone].  The patient also has a psychiatrist, Ivelisse Arauz, at [phone].    The patient started to have seizures probably in early childhood.  His seizures have been poorly controlled for many years.  At the present time, he continues to have frequent seizures. According to the group home staff, he is now still having one drop seizure about once every 6 weeks.  The patient moved to the new group home about 6 months ago.    The patient is currently on Banzel 2000 mg t.i.d., Lamictal 200 mg in the morning and 300 mg in the evening and Topamax 100 mg twice daily.    The patient has 2 types of seizures. Type #1 is described as \"drop seizures.\"  These are most likely atonic seizures. He will fall on the ground and shake. This will last for less than 2 minutes.  These are happening about once every 6 weeks at the present time.    Type #2 is probably generalized tonic-clonic seizures. These are infrequent.  The exact frequency is unclear at this time.  The group home staff thought they did not observe any for the past 6 months.    TRIGGERS FOR SEIZURES:  Unclear.    RISK FACTORS FOR SEIZURES: History of developmental delay.  No history of head trauma with loss of consciousness or febrile convulsions.      ALLERGIES:  No known drug allergies.    PAST MEDICAL HISTORY:  Symptomatic generalized epilepsy, developmental delay, obstructive sleep apnea.    PAST SURGICAL HISTORY:  None.    FAMILY HISTORY:  No family history of seizures.    SOCIAL HISTORY:  He lives in a group home.  No smoking, no alcohol, no drug abuse.  His guardian is Nadine Del Valle.    REVIEW OF SYSTEMS:  Unable to obtain.    PHYSICAL EXAMINATION:      Weight 159 lb (72.1 kg).    Alert, oriented to name only. Not to place or time. Very few owrds.    IMPRESSION:    1.  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  The " patient had frequent drop attacks about once every 4-6 weeks in the past that sometimes cause injury in spite of the fact that he is taking 3 antiseizure medications with high doses.     Since the last visit, he had no seizures.  He started on Epidiolex 5 mg/kg bid, 364 mg bid since 2021. No side effects were reported. He had no seizures since Epidiolex started.  He is currently taking Rufinamide 2000 mg tid, Topamax 50 mg bid, Lamictal 200-300, Epidionex 364 mg bid.     It seems that Epidiolex works extremely well for Salo.    2.  Developmental delay.    PLAN:    1.  Continue Lamictal 200-300, rufinamide 2000 mg t.i.d. and Topamax 100 mg b.i.d.   2.  Continue Epidiolex 5 mg/kg per day b.i.d. (3.64 ml, or 364 mg bid)  3.  We will discuss other options, such as VNS and corpus callosotomy, in the future with his guardian, Nadine Del Valle if seizures remains intractable.  4.  Return to clinic in 6 months.      23 min total time was spent on the day of this visit.      14 min was spent on face to face time  4 min was spent on preparation of visit to review charts and labs, ordering medications and tests  5 min was spent on documentation of clinical information    Sincerely,    Juan Luis Cruz MD

## 2022-08-11 NOTE — PATIENT INSTRUCTIONS
Times of Days am pm bedtime       Medication Tablet Size Number of Tablets/Capsules Total Daily Dosage    Epidiolex 100 mg/ml 5mg/kg 5mg/kg        360 mg    Lamictal 100 2 3       500 mg    Benzel 400 5 5 5     6000 mg    Topamax 100 1 1       200 mg                                                                             Carry this with you at all times.  CONTINUE TAKING YOUR OTHER MEDICATIONS AS PREVIOUSLY DIRECTED.      * * *Do not store medications in the bathroom.  Keep medications away from children!* * *       PLAN:    1.  Continue Lamictal 200-300, rufinamide 2000 mg t.i.d. and Topamax 100 mg b.i.d.   2.  Continue Epidiolex 5 mg/kg per day b.i.d. (3.64 ml, or 364 mg bid)  3.  We will discuss other options, such as VNS and corpus callosotomy, in the future with his guardian, Nadine Del Valle if seizures remains intractable.  4.  Return to clinic in 6 months.    
[Annual Visit] : annual visit

## 2022-08-11 NOTE — PROGRESS NOTES
Salo is a 46 year old who is being evaluated via a billable video visit.      How would you like to obtain your AVS? Mail a copy  If the video visit is dropped, the invitation should be resent by: Other e-mail: [email or web address]  Will anyone else be joining your video visit? Yes,  Marilyn from the group home will be joining.         Video-Visit Details    Video Start Time: 10:35 AM    Type of service:  Video Visit    Video End Time:10:49 AM    Originating Location (pt. Location): Home    Distant Location (provider location):  [facility] EPILE[facility]     Platform used for Video Visit: [facility]       Allergies have been reviewed and a medication list will be faxed over to be reviewed.      Eusebio Guerrier, VF

## 2022-08-11 NOTE — NURSING NOTE
Marilyn  states he will fax over a current medication list for medications to be updated, which will need to be signed and faxed back.

## 2022-08-11 NOTE — TELEPHONE ENCOUNTER
Received Medical Referral form to be completed.  Form saved to the [employer].  Encounter routed.    Evi Aguiar CMA

## 2022-08-12 RX ORDER — TOPIRAMATE 100 MG/1
100 TABLET, FILM COATED ORAL 2 TIMES DAILY
Qty: 62 TABLET | Refills: 11 | Status: SHIPPED | OUTPATIENT
Start: 2022-08-12 | End: 2023-02-09

## 2022-08-12 NOTE — TELEPHONE ENCOUNTER
8/12/2022: appointment referral form was prepared. It's not clear if the patient is taking the expected dose of topiramate.     Writer spoke with patient's pharmacist whom confirmed that the prescription most recently used was by Dr. Santana, 50 mg tabs, 2 tabs BID. Dr. Cruz was updated and a new prescription was sent.

## 2022-08-16 ENCOUNTER — MEDICAL CORRESPONDENCE (OUTPATIENT)
Dept: HEALTH INFORMATION MANAGEMENT | Facility: CLINIC | Age: 47
End: 2022-08-16

## 2022-12-28 ENCOUNTER — TELEPHONE (OUTPATIENT)
Dept: NEUROLOGY | Facility: CLINIC | Age: 47
End: 2022-12-28

## 2022-12-28 NOTE — TELEPHONE ENCOUNTER
Prior Authorization Retail Medication Request    Medication/Dose: Epidiolex SOL 100MG/ML  ICD code (if different than what is on RX):    Previously Tried and Failed:  See Chart  Rationale:  See Chart    Insurance Name:    Insurance ID:        Pharmacy Information (if different than what is on RX)  Name:    Phone:

## 2022-12-29 NOTE — TELEPHONE ENCOUNTER
Central Prior Authorization Team   Phone: [phone]      PA Initiation via Fax    Medication: Epidiolex SOL 100MG/ML  Insurance Company: [employer] - Phone [phone] Fax [phone]  Pharmacy Filling the Rx: [facility], [facility]. - [place], [place] [address] [address]  Filling Pharmacy Phone: [phone]  Filling Pharmacy Fax:    Start Date: 12/29/2022    Fax successful

## 2023-01-02 NOTE — TELEPHONE ENCOUNTER
Prior Authorization Approval    Authorization Effective Date: 12/29/2023  Authorization Expiration Date: 12/31/2023  Medication: Epidiolex SOL 100MG/ML-APPROVED  Approved Dose/Quantity:   Reference #:     Insurance Company: [employer] - Phone [phone] Fax [phone]  Expected CoPay:       CoPay Card Available:      Foundation Assistance Needed:    Which Pharmacy is filling the prescription (Not needed for infusion/clinic administered): [facility] [employer] - [place], [place] - [address]  Pharmacy Notified: Yes  Patient Notified: No

## 2023-02-09 ENCOUNTER — OFFICE VISIT (OUTPATIENT)
Dept: NEUROLOGY | Facility: CLINIC | Age: 48
End: 2023-02-09
Payer: MEDICAID

## 2023-02-09 VITALS — SYSTOLIC BLOOD PRESSURE: 102 MMHG | HEART RATE: 96 BPM | TEMPERATURE: 98.6 F | DIASTOLIC BLOOD PRESSURE: 69 MMHG

## 2023-02-09 DIAGNOSIS — G40.909 RECURRENT SEIZURES (H): ICD-10-CM

## 2023-02-09 DIAGNOSIS — G40.309 GENERALIZED TONIC CLONIC EPILEPSY (H): ICD-10-CM

## 2023-02-09 RX ORDER — RUFINAMIDE 400 MG/1
TABLET, FILM COATED ORAL
Qty: 465 TABLET | Refills: 11 | Status: SHIPPED | OUTPATIENT
Start: 2023-02-09 | End: 2023-08-14

## 2023-02-09 RX ORDER — CANNABIDIOL 100 MG/ML
SOLUTION ORAL
Qty: 200 ML | Refills: 11 | Status: SHIPPED | OUTPATIENT
Start: 2023-02-09 | End: 2023-08-14

## 2023-02-09 RX ORDER — TOPIRAMATE 100 MG/1
100 TABLET, FILM COATED ORAL 2 TIMES DAILY
Qty: 62 TABLET | Refills: 11 | Status: SHIPPED | OUTPATIENT
Start: 2023-02-09 | End: 2023-08-14

## 2023-02-09 RX ORDER — LAMOTRIGINE 100 MG/1
TABLET ORAL
Qty: 150 TABLET | Refills: 11 | Status: SHIPPED | OUTPATIENT
Start: 2023-02-09 | End: 2023-08-14

## 2023-02-09 NOTE — PROGRESS NOTES
"CHIEF COMPLAINT:  Follow up for epilepsy.    HISTORY OF PRESENT ILLNESS:  In person follow up. This patient is a 47-year-old, right-handed male with history of intractable epilepsy and history of developmental delay.  He is accompanied by his group home staff [facility]ora during the visit.     Since the last visit, he had no seizures.  He started on Epidiolex 5 mg/kg bid, 364 mg bid since 2021. No side effects were reported. He had no seizures since Epidiolex started.  He is currently taking Rufinamide 2000 mg tid, Topamax 100 mg bid, Lamictal 200-300, Epidionex 364 mg bid. His group home staff are very pleased with the results and would like to continue the status quo.     He was previously seen at the [facility] many years ago.  For the past 6 years, he was seen at [place].  One of the goals of the group home staff today is to see if the patient can start on Epidiolex to control his seizures better.  The patient's guardian is Nadine Del Valle; her telephone number is [phone].  The patient also has a psychiatrist, Ivelisse Arauz, at [phone].    The patient started to have seizures probably in early childhood.  His seizures have been poorly controlled for many years.  At the present time, he continues to have frequent seizures. According to the group home staff, he is now still having one drop seizure about once every 6 weeks.  The patient moved to the new group home about 6 months ago.    The patient is currently on Banzel 2000 mg t.i.d., Lamictal 200 mg in the morning and 300 mg in the evening and Topamax 100 mg twice daily.    The patient has 2 types of seizures. Type #1 is described as \"drop seizures.\"  These are most likely atonic seizures. He will fall on the ground and shake. This will last for less than 2 minutes.  These are happening about once every 6 weeks at the present time.    Type #2 is probably generalized tonic-clonic seizures. These are infrequent.  The exact frequency is unclear at " this time.  The group home staff thought they did not observe any for the past 6 months.    TRIGGERS FOR SEIZURES:  Unclear.    RISK FACTORS FOR SEIZURES: History of developmental delay.  No history of head trauma with loss of consciousness or febrile convulsions.      ALLERGIES:  No known drug allergies.    PAST MEDICAL HISTORY:  Symptomatic generalized epilepsy, developmental delay, obstructive sleep apnea.    PAST SURGICAL HISTORY:  None.    FAMILY HISTORY:  No family history of seizures.    SOCIAL HISTORY:  He lives in a group home.  No smoking, no alcohol, no drug abuse.  His guardian is Nadine Del Valle.    REVIEW OF SYSTEMS:  Unable to obtain.    PHYSICAL EXAMINATION:    Blood pressure 102/69, pulse 96, temperature 98.6  F (37  C), temperature source Temporal.    General exam: General Appearance: No acute distress. HEENT: Normocephalic, atraumatic. Neck: Supple.  Extremities: No edema.     Neurologic Exam: Alert and oriented to name only. Very few words, follow commands. Cranial Nerves: Pupils are equal, round, reactive to light and accomodation. Extraocular movement intact. No facial weakness or asymmetry. Hearing normal. Motor Exam: Normal. Coordination:no ataxia.  Gait and Station: normal.      IMPRESSION:    1.  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  The patient had frequent drop attacks about once every 4-6 weeks in the past that sometimes cause injury in spite of the fact that he was taking 3 antiseizure medications with high doses.     Since the last visit, he had no seizures.  He had no seizures since Epidiolex started.  He is currently taking Rufinamide 2000 mg tid, Topamax 100 mg bid, Lamictal 200-300, Epidionex 364 mg bid. No side effects were reported with AEDs.    It seems that Epidiolex works extremely well for Salo.    2.  Developmental delay.    PLAN:    1.  Continue Lamictal 200-300, rufinamide 2000 mg t.i.d. and Topamax 100 mg b.i.d.   2.  Continue Epidiolex 5  mg/kg per day b.i.d. (3.64 ml, or 364 mg bid)  3.  We will discuss other options, such as VNS and corpus callosotomy, in the future with his guardian, Nadine Del Valle if seizures remains intractable.  4.  Return to clinic in 6 months.      30 min total time was spent on the day of this visit.      20 min was spent on face to face time  10 min was spent on preparation of visit to review charts and labs, ordering medications and tests, and documentation of clinical information

## 2023-02-09 NOTE — LETTER
"2023     RE: Sharona Man  : 1975   MRN: [id number]      Dear Colleague,    Thank you for referring your patient, Sharona Man, to the [facility] at [facility]. Please see a copy of my visit note below.    CHIEF COMPLAINT:  Follow up for epilepsy.    HISTORY OF PRESENT ILLNESS:  In person follow up. This patient is a 47-year-old, right-handed male with history of intractable epilepsy and history of developmental delay.  He is accompanied by his group home staff Lenora during the visit.     Since the last visit, he had no seizures.  He started on Epidiolex 5 mg/kg bid, 364 mg bid since . No side effects were reported. He had no seizures since Epidiolex started.  He is currently taking Rufinamide 2000 mg tid, Topamax 100 mg bid, Lamictal 200-300, Epidionex 364 mg bid. His group home staff are very pleased with the results and would like to continue the status quo.     He was previously seen at the [facility] many years ago.  For the past 6 years, he was seen at [place].  One of the goals of the group home staff today is to see if the patient can start on Epidiolex to control his seizures better.  The patient's guardian is Nadine Del Valle; her telephone number is [phone].  The patient also has a psychiatrist, Ivelisse Aruaz, at [phone].    The patient started to have seizures probably in early childhood.  His seizures have been poorly controlled for many years.  At the present time, he continues to have frequent seizures. According to the group home staff, he is now still having one drop seizure about once every 6 weeks.  The patient moved to the new group home about 6 months ago.    The patient is currently on Banzel 2000 mg t.i.d., Lamictal 200 mg in the morning and 300 mg in the evening and Topamax 100 mg twice daily.    The patient has 2 types of seizures. Type #1 is described as \"drop " "seizures.\"  These are most likely atonic seizures. He will fall on the ground and shake. This will last for less than 2 minutes.  These are happening about once every 6 weeks at the present time.    Type #2 is probably generalized tonic-clonic seizures. These are infrequent.  The exact frequency is unclear at this time.  The group home staff thought they did not observe any for the past 6 months.    TRIGGERS FOR SEIZURES:  Unclear.    RISK FACTORS FOR SEIZURES: History of developmental delay.  No history of head trauma with loss of consciousness or febrile convulsions.    ALLERGIES:  No known drug allergies.    PAST MEDICAL HISTORY:  Symptomatic generalized epilepsy, developmental delay, obstructive sleep apnea.    PAST SURGICAL HISTORY:  None.    FAMILY HISTORY:  No family history of seizures.    SOCIAL HISTORY:  He lives in a group home.  No smoking, no alcohol, no drug abuse.  His guardian is Nadine Del Valle.    REVIEW OF SYSTEMS:  Unable to obtain.    PHYSICAL EXAMINATION:    Blood pressure 102/69, pulse 96, temperature 98.6  F (37  C), temperature source Temporal.    General exam: General Appearance: No acute distress. HEENT: Normocephalic, atraumatic. Neck: Supple.  Extremities: No edema.     Neurologic Exam: Alert and oriented to name only. Very few words, follow commands. Cranial Nerves: Pupils are equal, round, reactive to light and accomodation. Extraocular movement intact. No facial weakness or asymmetry. Hearing normal. Motor Exam: Normal. Coordination:no ataxia.  Gait and Station: normal.    IMPRESSION:    1.  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  The patient had frequent drop attacks about once every 4-6 weeks in the past that sometimes cause injury in spite of the fact that he was taking 3 antiseizure medications with high doses.     Since the last visit, he had no seizures.  He had no seizures since Epidiolex started.  He is currently taking Rufinamide 2000 mg tid, " Topamax 100 mg bid, Lamictal 200-300, Epidionex 364 mg bid. No side effects were reported with AEDs.    It seems that Epidiolex works extremely well for Salo.    2.  Developmental delay.    PLAN:    1.  Continue Lamictal 200-300, rufinamide 2000 mg t.i.d. and Topamax 100 mg b.i.d.   2.  Continue Epidiolex 5 mg/kg per day b.i.d. (3.64 ml, or 364 mg bid)  3.  We will discuss other options, such as VNS and corpus callosotomy, in the future with his guardian, Nadine Del Valle if seizures remains intractable.  4.  Return to clinic in 6 months.    30 min total time was spent on the day of this visit.    20 min was spent on face to face time  10 min was spent on preparation of visit to review charts and labs, ordering medications and tests, and documentation of clinical information    Again, thank you for allowing me to participate in the care of your patient.    Sincerely,  Juan Luis Cruz MD

## 2023-02-09 NOTE — PATIENT INSTRUCTIONS
Times of Days am pm bedtime       Medication Tablet Size Number of Tablets/Capsules Total Daily Dosage    Epidiolex 100 mg/ml 5mg/kg 5mg/kg       360 mg    Lamictal 100 2 3       500 mg    Benzel 400 5 5 5     6000 mg    Topamax 100 1 1       200 mg                                                                             Carry this with you at all times.  CONTINUE TAKING YOUR OTHER MEDICATIONS AS PREVIOUSLY DIRECTED.      * * *Do not store medications in the bathroom.  Keep medications away from children!* * *       PLAN:    1.  Continue Lamictal 200-300, rufinamide 2000 mg t.i.d. and Topamax 100 mg b.i.d.   2.  Continue Epidiolex 5 mg/kg per day b.i.d. (3.64 ml, or 364 mg bid)  3.  We will discuss other options, such as VNS and corpus callosotomy, in the future with his guardian, Nadine Del Valle if seizures remains intractable.  4.  Return to clinic in 6 months.     7

## 2023-08-14 ENCOUNTER — OFFICE VISIT (OUTPATIENT)
Dept: NEUROLOGY | Facility: CLINIC | Age: 48
End: 2023-08-14
Payer: MEDICAID

## 2023-08-14 VITALS
TEMPERATURE: 97.3 F | DIASTOLIC BLOOD PRESSURE: 75 MMHG | HEART RATE: 79 BPM | WEIGHT: 172.8 LBS | BODY MASS INDEX: 26.66 KG/M2 | SYSTOLIC BLOOD PRESSURE: 111 MMHG

## 2023-08-14 DIAGNOSIS — G40.309 GENERALIZED TONIC CLONIC EPILEPSY (H): ICD-10-CM

## 2023-08-14 DIAGNOSIS — G40.909 RECURRENT SEIZURES (H): ICD-10-CM

## 2023-08-14 PROCEDURE — 80210 DRUG ASSAY RUFINAMIDE: CPT | Mod: DMND,ORL | Performed by: PSYCHIATRY & NEUROLOGY

## 2023-08-14 PROCEDURE — 80175 DRUG SCREEN QUAN LAMOTRIGINE: CPT | Mod: DMND,ORL | Performed by: PSYCHIATRY & NEUROLOGY

## 2023-08-14 PROCEDURE — 80201 ASSAY OF TOPIRAMATE: CPT | Mod: DMND,ORL | Performed by: PSYCHIATRY & NEUROLOGY

## 2023-08-14 RX ORDER — CANNABIDIOL 100 MG/ML
SOLUTION ORAL
Qty: 200 ML | Refills: 11 | Status: SHIPPED | OUTPATIENT
Start: 2023-08-14 | End: 2024-07-16

## 2023-08-14 RX ORDER — RUFINAMIDE 400 MG/1
TABLET, FILM COATED ORAL
Qty: 450 TABLET | Refills: 11 | Status: SHIPPED | OUTPATIENT
Start: 2023-08-14 | End: 2024-08-15

## 2023-08-14 RX ORDER — LAMOTRIGINE 100 MG/1
TABLET ORAL
Qty: 150 TABLET | Refills: 11 | Status: SHIPPED | OUTPATIENT
Start: 2023-08-14 | End: 2024-08-15

## 2023-08-14 RX ORDER — TOPIRAMATE 100 MG/1
100 TABLET, FILM COATED ORAL 2 TIMES DAILY
Qty: 62 TABLET | Refills: 11 | Status: SHIPPED | OUTPATIENT
Start: 2023-08-14 | End: 2024-08-15

## 2023-08-14 NOTE — LETTER
2023       RE: Sharona Man  : 1975   MRN: [id number]        Dear Colleague,    Thank you for referring your patient, Sharona Man, to the [facility] at [facility]. Please see a copy of my visit note below.    CHIEF COMPLAINT:  Follow up for epilepsy.    HISTORY OF PRESENT ILLNESS:  In person follow up. This patient is a 47-year-old, right-handed male with history of intractable epilepsy and history of developmental delay.  He is accompanied by his group home staff [facility] during the visit.     Since the last visit, he had no seizures.  He started on Epidiolex 5 mg/kg bid, 364 mg bid since . No side effects were reported. He had no seizures since Epidiolex started.  He is currently taking Rufinamide 2000 mg tid, Topamax 100 mg bid, Lamictal 200-300, Epidionex 364 mg bid. His group home staff are very pleased with the results and would like to continue the status quo.     He was previously seen at the [facility] many years ago.  For the past 6 years, he was seen at [place].  One of the goals of the group home staff today is to see if the patient can start on Epidiolex to control his seizures better.  The patient's guardian is Nadine Del Valle; her telephone number is [phone].  The patient also has a psychiatrist, Ivelisse Arauz, at [phone].    The patient started to have seizures probably in early childhood.  His seizures have been poorly controlled for many years.  At the present time, he continues to have frequent seizures. According to the group home staff, he is now still having one drop seizure about once every 6 weeks.  The patient moved to the new group home about 6 months ago.    The patient is currently on Banzel 2000 mg t.i.d., Lamictal 200 mg in the morning and 300 mg in the evening and Topamax 100 mg twice daily.    The patient has 2 types of seizures. Type #1 is  "described as \"drop seizures.\"  These are most likely atonic seizures. He will fall on the ground and shake. This will last for less than 2 minutes.  These are happening about once every 6 weeks at the present time.    Type #2 is probably generalized tonic-clonic seizures. These are infrequent.  The exact frequency is unclear at this time.  The group home staff thought they did not observe any for the past 6 months.    TRIGGERS FOR SEIZURES:  Unclear.    RISK FACTORS FOR SEIZURES: History of developmental delay.  No history of head trauma with loss of consciousness or febrile convulsions.      ALLERGIES:  No known drug allergies.    PAST MEDICAL HISTORY:  Symptomatic generalized epilepsy, developmental delay, obstructive sleep apnea.    PAST SURGICAL HISTORY:  None.    FAMILY HISTORY:  No family history of seizures.    SOCIAL HISTORY:  He lives in a group home.  No smoking, no alcohol, no drug abuse.  His guardian is Nadine Del Valle.    REVIEW OF SYSTEMS:  Unable to obtain.    PHYSICAL EXAMINATION:    Blood pressure 111/75, pulse 79, temperature 97.3  F (36.3  C), temperature source Temporal, weight 172 lb 12.8 oz (78.4 kg).    General exam: General Appearance: No acute distress. HEENT: Normocephalic, atraumatic. Neck: Supple.  Extremities: No edema.     Neurologic Exam: Alert and oriented to name only. Very few words, follow commands. Cranial Nerves: Pupils are equal, round, reactive to light and accomodation. Extraocular movement intact. No facial weakness or asymmetry. Hearing normal. Motor Exam: Normal. Coordination:no ataxia.  Gait and Station: normal.      IMPRESSION:    1.  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  The patient had frequent drop attacks about once every 4-6 weeks in the past that sometimes cause injury in spite of the fact that he was taking 3 antiseizure medications with high doses. He had no drop attacks after he started on Epidiolex.    Since the last visit, he " had no seizures.  He started on Epidiolex 5 mg/kg bid, 364 mg bid since 2021. No side effects were reported. He had no seizures since Epidiolex started.  He is currently taking Rufinamide 2000 mg tid, Topamax 100 mg bid, Lamictal 200-300, Epidionex 364 mg bid. His group home staff are very pleased with the results and would like to continue the status quo.     It seems that Epidiolex works extremely well for Salo.    2.  Developmental delay.    PLAN:    1.  Continue Lamictal 200-300, rufinamide 2000 mg t.i.d. and Topamax 100 mg b.i.d.   2.  Continue Epidiolex 5 mg/kg per day b.i.d. (3.64 ml, or 364 mg bid)  3.  Labs: Lamictal, Topamax, Rufinamide  4.  We will discuss other options, such as VNS and corpus callosotomy, in the future with his guardian, Nadine Del Valle if seizures remains intractable.  5.  Return to clinic in 6 months.      35 min total time was spent on the day of this visit.      23 min was spent on face to face time  12 min was spent on preparation of visit to review charts and labs, ordering medications and tests, and documentation of clinical information      Again, thank you for allowing me to participate in the care of your patient.      Sincerely,    Juan Luis Cruz MD

## 2023-08-14 NOTE — PATIENT INSTRUCTIONS
PLAN:    1.  Continue Lamictal 200-300, rufinamide 2000 mg t.i.d. and Topamax 100 mg b.i.d.   2.  Continue Epidiolex 5 mg/kg per day b.i.d. (3.64 ml, or 364 mg bid)  3.  Labs: Lamictal, Topamax, Rufinamide  4.  We will discuss other options, such as VNS and corpus callosotomy, in the future with his guardian, Nadine Del Valle if seizures remains intractable.  5.  Return to clinic in 6 months.

## 2023-08-14 NOTE — PROGRESS NOTES
"CHIEF COMPLAINT:  Follow up for epilepsy.    HISTORY OF PRESENT ILLNESS:  In person follow up. This patient is a 47-year-old, right-handed male with history of intractable epilepsy and history of developmental delay.  He is accompanied by his group home staff [facility] during the visit.     Since the last visit, he had no seizures.  He started on Epidiolex 5 mg/kg bid, 364 mg bid since 2021. No side effects were reported. He had no seizures since Epidiolex started.  He is currently taking Rufinamide 2000 mg tid, Topamax 100 mg bid, Lamictal 200-300, Epidionex 364 mg bid. His group home staff are very pleased with the results and would like to continue the status quo.     He was previously seen at the [facility] many years ago.  For the past 6 years, he was seen at [place].  One of the goals of the group home staff today is to see if the patient can start on Epidiolex to control his seizures better.  The patient's guardian is Nadine Del Valle; her telephone number is [phone].  The patient also has a psychiatrist, Ivelisse Arauz, at [phone].    The patient started to have seizures probably in early childhood.  His seizures have been poorly controlled for many years.  At the present time, he continues to have frequent seizures. According to the group home staff, he is now still having one drop seizure about once every 6 weeks.  The patient moved to the new group home about 6 months ago.    The patient is currently on Banzel 2000 mg t.i.d., Lamictal 200 mg in the morning and 300 mg in the evening and Topamax 100 mg twice daily.    The patient has 2 types of seizures. Type #1 is described as \"drop seizures.\"  These are most likely atonic seizures. He will fall on the ground and shake. This will last for less than 2 minutes.  These are happening about once every 6 weeks at the present time.    Type #2 is probably generalized tonic-clonic seizures. These are infrequent.  The exact frequency is unclear at " this time.  The group home staff thought they did not observe any for the past 6 months.    TRIGGERS FOR SEIZURES:  Unclear.    RISK FACTORS FOR SEIZURES: History of developmental delay.  No history of head trauma with loss of consciousness or febrile convulsions.      ALLERGIES:  No known drug allergies.    PAST MEDICAL HISTORY:  Symptomatic generalized epilepsy, developmental delay, obstructive sleep apnea.    PAST SURGICAL HISTORY:  None.    FAMILY HISTORY:  No family history of seizures.    SOCIAL HISTORY:  He lives in a group home.  No smoking, no alcohol, no drug abuse.  His guardian is Nadine Del Valle.    REVIEW OF SYSTEMS:  Unable to obtain.    PHYSICAL EXAMINATION:    Blood pressure 111/75, pulse 79, temperature 97.3  F (36.3  C), temperature source Temporal, weight 172 lb 12.8 oz (78.4 kg).    General exam: General Appearance: No acute distress. HEENT: Normocephalic, atraumatic. Neck: Supple.  Extremities: No edema.     Neurologic Exam: Alert and oriented to name only. Very few words, follow commands. Cranial Nerves: Pupils are equal, round, reactive to light and accomodation. Extraocular movement intact. No facial weakness or asymmetry. Hearing normal. Motor Exam: Normal. Coordination:no ataxia.  Gait and Station: normal.      IMPRESSION:    1.  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  The patient had frequent drop attacks about once every 4-6 weeks in the past that sometimes cause injury in spite of the fact that he was taking 3 antiseizure medications with high doses. He had no drop attacks after he started on Epidiolex.    Since the last visit, he had no seizures.  He started on Epidiolex 5 mg/kg bid, 364 mg bid since 2021. No side effects were reported. He had no seizures since Epidiolex started.  He is currently taking Rufinamide 2000 mg tid, Topamax 100 mg bid, Lamictal 200-300, Epidionex 364 mg bid. His group home staff are very pleased with the results and would like  to continue the status quo.     It seems that Epidiolex works extremely well for Salo.    2.  Developmental delay.    PLAN:    1.  Continue Lamictal 200-300, rufinamide 2000 mg t.i.d. and Topamax 100 mg b.i.d.   2.  Continue Epidiolex 5 mg/kg per day b.i.d. (3.64 ml, or 364 mg bid)  3.  Labs: Lamictal, Topamax, Rufinamide  4.  We will discuss other options, such as VNS and corpus callosotomy, in the future with his guardian, Nadine Del Valle if seizures remains intractable.  5.  Return to clinic in 6 months.      35 min total time was spent on the day of this visit.      23 min was spent on face to face time  12 min was spent on preparation of visit to review charts and labs, ordering medications and tests, and documentation of clinical information

## 2023-08-16 LAB
LAMOTRIGINE SERPL-MCNC: 7.6 UG/ML
TOPIRAMATE SERPL-MCNC: 5.6 UG/ML

## 2023-08-17 LAB — RUFINAMIDE SERPL-MCNC: 36.2 UG/ML

## 2024-02-15 ENCOUNTER — OFFICE VISIT (OUTPATIENT)
Dept: NEUROLOGY | Facility: CLINIC | Age: 49
End: 2024-02-15
Payer: MEDICAID

## 2024-02-15 VITALS
TEMPERATURE: 97.1 F | HEART RATE: 80 BPM | OXYGEN SATURATION: 100 % | SYSTOLIC BLOOD PRESSURE: 117 MMHG | DIASTOLIC BLOOD PRESSURE: 79 MMHG

## 2024-02-15 DIAGNOSIS — G40.909 SEIZURE DISORDER (H): Primary | ICD-10-CM

## 2024-02-15 NOTE — PROGRESS NOTES
"CHIEF COMPLAINT:  Follow up for epilepsy.    HISTORY OF PRESENT ILLNESS:  In person follow up. This patient is a 48-year-old, right-handed male with history of intractable epilepsy and history of developmental delay.  He is accompanied by his group home staff [facility] during the visit.     Since the last visit, he had no seizures.  He started on Epidiolex 5 mg/kg bid, 364 mg bid since 2021. No side effects were reported. He had no seizures since Epidiolex started.  He is currently taking Rufinamide 2000 mg tid, Topamax 100 mg bid, Lamictal 200-300, Epidionex 364 mg bid. His group home staff was very pleased with the results and would like to continue the status quo.     He was previously seen at the [facility] many years ago.  For the past 6 years, he was seen at [place].  One of the goals of the group home staff today is to see if the patient can start on Epidiolex to control his seizures better.  The patient's guardian is Nadine Del Valle; her telephone number is [phone].  The patient also has a psychiatrist, Ivelisse Arauz, at [phone].    The patient started to have seizures probably in early childhood.  His seizures have been poorly controlled for many years.  At the present time, he continues to have frequent seizures. According to the group home staff, he is now still having one drop seizure about once every 6 weeks.  The patient moved to the new group home about 6 months ago.    The patient is currently on Banzel 2000 mg t.i.d., Lamictal 200 mg in the morning and 300 mg in the evening and Topamax 100 mg twice daily.    The patient has 2 types of seizures. Type #1 is described as \"drop seizures.\"  These are most likely atonic seizures. He will fall on the ground and shake. This will last for less than 2 minutes.  These are happening about once every 6 weeks at the present time.    Type #2 is probably generalized tonic-clonic seizures. These are infrequent.  The exact frequency is unclear at " this time.  The group home staff thought they did not observe any for the past 6 months.    TRIGGERS FOR SEIZURES:  Unclear.    RISK FACTORS FOR SEIZURES: History of developmental delay.  No history of head trauma with loss of consciousness or febrile convulsions.      ALLERGIES:  No known drug allergies.    PAST MEDICAL HISTORY:  Symptomatic generalized epilepsy, developmental delay, obstructive sleep apnea.    PAST SURGICAL HISTORY:  None.    FAMILY HISTORY:  No family history of seizures.    SOCIAL HISTORY:  He lives in a group home.  No smoking, no alcohol, no drug abuse.  His guardian is Nadine Del Valle.    REVIEW OF SYSTEMS:  Unable to obtain.    PHYSICAL EXAMINATION:    Blood pressure 117/79, pulse 80, temperature 97.1  F (36.2  C), temperature source Temporal, SpO2 100%.    General exam: General Appearance: No acute distress. HEENT: Normocephalic, atraumatic. Neck: Supple.  Extremities: No edema.     Neurologic Exam: Alert and oriented to name only. Very few words, follow commands. Cranial Nerves: Pupils are equal, round, reactive to light and accomodation. Extraocular movement intact. No facial weakness or asymmetry. Hearing normal. Motor Exam: Normal. Coordination:no ataxia.  Gait and Station: normal.      IMPRESSION:    1.  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  The patient had frequent drop attacks about once every 4-6 weeks in the past that sometimes cause injury in spite of the fact that he was taking 3 antiseizure medications with high doses. He had no drop attacks after he started on Epidiolex.    Since the last visit, he had no seizures.  He started on Epidiolex 5 mg/kg bid, 364 mg bid since 2021. No side effects were reported. He had no seizures since Epidiolex started.  He is currently taking Rufinamide 2000 mg tid, Topamax 100 mg bid, Lamictal 200-300, Epidionex 364 mg bid. His group home staff was very pleased with the results and would like to continue the status  quo.    It seems that Epidiolex works extremely well for Salo.    2.  Developmental delay.    PLAN:    1.  Continue Lamictal 200-300, rufinamide 2000 mg t.i.d. and Topamax 100 mg b.i.d.   2.  Continue Epidiolex 5 mg/kg per day b.i.d. (3.64 ml, or 364 mg bid)  3.  Labs: Lamictal, Topamax, Rufinamide  4.  We will discuss other options, such as VNS and corpus callosotomy, in the future with his guardian, Nadine Del Valle if seizures remains intractable.  5.  Return to clinic in 6 months.      35 min total time was spent on the day of this visit.      25 min was spent on face to face time  10 min was spent on preparation of visit to review charts and labs, ordering medications and tests, and documentation of clinical information

## 2024-02-15 NOTE — PATIENT INSTRUCTIONS
PLAN:    1.  Continue Lamictal 200-300, rufinamide 2000 mg t.i.d. and Topamax 100 mg b.i.d.   2.  Continue Epidiolex 5 mg/kg per day b.i.d. (3.64 ml, or 364 mg bid)  3.  Labs: Lamictal, Topamax, Rufinamide  4.  Return to clinic in 6 months.

## 2024-02-15 NOTE — LETTER
2/15/2024       RE: Sharona Man  : 1975   MRN: [id number]      Dear Colleague,    Thank you for referring your patient, Sharona Man, to the [facility] at [facility]. Please see a copy of my visit note below.    CHIEF COMPLAINT:  Follow up for epilepsy.    HISTORY OF PRESENT ILLNESS:  In person follow up. This patient is a 48-year-old, right-handed male with history of intractable epilepsy and history of developmental delay.  He is accompanied by his group home staff [facility] during the visit.     Since the last visit, he had no seizures.  He started on Epidiolex 5 mg/kg bid, 364 mg bid since . No side effects were reported. He had no seizures since Epidiolex started.  He is currently taking Rufinamide 2000 mg tid, Topamax 100 mg bid, Lamictal 200-300, Epidionex 364 mg bid. His group home staff was very pleased with the results and would like to continue the status quo.     He was previously seen at the [facility] many years ago.  For the past 6 years, he was seen at [place].  One of the goals of the group home staff today is to see if the patient can start on Epidiolex to control his seizures better.  The patient's guardian is Nadine Del Valle; her telephone number is [phone].  The patient also has a psychiatrist, Ivelisse Arauz, at [phone].    The patient started to have seizures probably in early childhood.  His seizures have been poorly controlled for many years.  At the present time, he continues to have frequent seizures. According to the group home staff, he is now still having one drop seizure about once every 6 weeks.  The patient moved to the new group home about 6 months ago.    The patient is currently on Banzel 2000 mg t.i.d., Lamictal 200 mg in the morning and 300 mg in the evening and Topamax 100 mg twice daily.    The patient has 2 types of seizures. Type #1 is described  "as \"drop seizures.\"  These are most likely atonic seizures. He will fall on the ground and shake. This will last for less than 2 minutes.  These are happening about once every 6 weeks at the present time.    Type #2 is probably generalized tonic-clonic seizures. These are infrequent.  The exact frequency is unclear at this time.  The group home staff thought they did not observe any for the past 6 months.    TRIGGERS FOR SEIZURES:  Unclear.    RISK FACTORS FOR SEIZURES: History of developmental delay.  No history of head trauma with loss of consciousness or febrile convulsions.      ALLERGIES:  No known drug allergies.    PAST MEDICAL HISTORY:  Symptomatic generalized epilepsy, developmental delay, obstructive sleep apnea.    PAST SURGICAL HISTORY:  None.    FAMILY HISTORY:  No family history of seizures.    SOCIAL HISTORY:  He lives in a group home.  No smoking, no alcohol, no drug abuse.  His guardian is Nadine Del Valle.    REVIEW OF SYSTEMS:  Unable to obtain.    PHYSICAL EXAMINATION:    Blood pressure 117/79, pulse 80, temperature 97.1  F (36.2  C), temperature source Temporal, SpO2 100%.    General exam: General Appearance: No acute distress. HEENT: Normocephalic, atraumatic. Neck: Supple.  Extremities: No edema.     Neurologic Exam: Alert and oriented to name only. Very few words, follow commands. Cranial Nerves: Pupils are equal, round, reactive to light and accomodation. Extraocular movement intact. No facial weakness or asymmetry. Hearing normal. Motor Exam: Normal. Coordination:no ataxia.  Gait and Station: normal.      IMPRESSION:    1.  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  The patient had frequent drop attacks about once every 4-6 weeks in the past that sometimes cause injury in spite of the fact that he was taking 3 antiseizure medications with high doses. He had no drop attacks after he started on Epidiolex.    Since the last visit, he had no seizures.  He started on " Epidiolex 5 mg/kg bid, 364 mg bid since 2021. No side effects were reported. He had no seizures since Epidiolex started.  He is currently taking Rufinamide 2000 mg tid, Topamax 100 mg bid, Lamictal 200-300, Epidionex 364 mg bid. His group home staff was very pleased with the results and would like to continue the status quo.    It seems that Epidiolex works extremely well for Salo.    2.  Developmental delay.    PLAN:    1.  Continue Lamictal 200-300, rufinamide 2000 mg t.i.d. and Topamax 100 mg b.i.d.   2.  Continue Epidiolex 5 mg/kg per day b.i.d. (3.64 ml, or 364 mg bid)  3.  Labs: Lamictal, Topamax, Rufinamide  4.  We will discuss other options, such as VNS and corpus callosotomy, in the future with his guardian, Nadine Del Valle if seizures remains intractable.  5.  Return to clinic in 6 months.      35 min total time was spent on the day of this visit.      25 min was spent on face to face time  10 min was spent on preparation of visit to review charts and labs, ordering medications and tests, and documentation of clinical information          Again, thank you for allowing me to participate in the care of your patient.      Sincerely,    Juan Luis Cruz MD

## 2024-02-15 NOTE — LETTER
[facility]  [address], [address]  [facility] [address]  Phone: [phone]  Fax: [phone]      SEIZURE PLAN AND PROTOCOL     Name: Sharona Man  MRN:  [id number]   :  1975   Date:  2/15/2024      Type of seizure(s):      Seizure Type 1: Atonic (astatic) seizures  Description of Sz Type 1: Falls, shakes, lasts less than 2 minutes     Seizure Type 2: Tonic-clonic seizures  Description of Sz Type 2: convulsion     Plan of Care:      Follow general seizure care and First Aid guidelines for seizures.   Anticonvulsant medications will be administered as prescribed.   All seizures will be documented on a Seizure Report including:  date, time, length of seizure, specific body movements and behaviors exhibited during the seizure, and post-seizure state.  Antiepileptic blood levels will be ordered by the physician based upon client's condition and medications.     Missed Doses:     IF YOU REALIZE WITHIN 24 HOURS:     ...You MISSED ONE DOSE of your anticonvulsant medication(s), take the missed dose immediately unless it is time for your next scheduled dose. If that is the case, take your next scheduled dose, wait two hours, and then take the missed dose.     ...You MISSED TWO OR MORE DOSES, take one of the missed doses immediately, even if it is time for your next scheduled dose. Then call the [facility] clinic to schedule an appointment.      IF YOU REALIZE NOW YOU MISSED A DOSE MORE THAN 24 HOURS AGO, mukesh it on your calendar. DO NOT take an extra dose.           Medication Errors:     Your facility nurse should be notified of any medication errors. The nurse should observe the urgency of the error. It is not necessary to notify the MD on call unless the facility nurse or delegate believes it to be life threatening or could possibly result in a serious complication. Routine notices required by regulation should be telephoned to the clinic during office  hours.     Extra Dose:     An extra dose of an antiepileptic drug is not serious. Ordinarily, at most, the client may experience increased side effects for several hours. Contact your facility nurse immediately if an extra dose was given.     Seizure Protocol:     Lorazepam intensol 2mg/ml: Give 1ml (2mg) between cheek and gums for any seizure > 3 minutes, for 2 seizures in 1 hour, or for 3 or more seizures in 24 hours. Do not exceed 2 doses in 24 hours.     When to call 911 for Seizures:     Call 911 if:     The person does not start breathing within 1 minute after the seizure. If this happens call 911 immediately and start CPR.     The person sustains an injury     The person has one seizure right after another without regaining consciousness in between     A GTC seizure lasts over 5 minutes     The person requests an ambulance     Provider:  Dr. Juan Luis Cruz

## 2024-02-16 ENCOUNTER — TELEPHONE (OUTPATIENT)
Dept: NEUROLOGY | Facility: CLINIC | Age: 49
End: 2024-02-16

## 2024-07-02 DIAGNOSIS — G40.909 RECURRENT SEIZURES (H): ICD-10-CM

## 2024-07-09 NOTE — TELEPHONE ENCOUNTER
Epidiolex 100 MG/ML Solution   TAKE 3.64ML (364MG) BY MOUTH TWICE DAILY. **DISPENSE FULL BOTTLE IN ORIGINAL CONTAINER**   Last Written Prescription Date:  8/14/23  Last Fill Quantity: 200 ml ,   # refills: 11  Last Office Visit : 2/15/24  Future Office visit:  8/15/24    Routing refill request to provider for review/approval because:  Drug not on the [facility], [facility] or [facility] refill protocol or controlled substance  2/15/24 PLAN:    1.  Continue Lamictal 200-300, rufinamide 2000 mg t.i.d. and Topamax 100 mg b.i.d.   2.  Continue Epidiolex 5 mg/kg per day b.i.d. (3.64 ml, or 364 mg bid)  3.  Labs: Lamictal, Topamax, Rufinamide  4.  We will discuss other options, such as VNS and corpus callosotomy, in the future with his guardian, Nadine Del Valle if seizures remains intractable.  5.  Return to clinic in 6 months.

## 2024-07-16 RX ORDER — CANNABIDIOL 100 MG/ML
SOLUTION ORAL
Qty: 200 ML | Refills: 11 | Status: SHIPPED | OUTPATIENT
Start: 2024-07-16 | End: 2024-08-15

## 2024-07-30 DIAGNOSIS — G40.309 GENERALIZED TONIC CLONIC EPILEPSY (H): ICD-10-CM

## 2024-07-30 NOTE — TELEPHONE ENCOUNTER
Pt is requesting copy of the doctors orders/instructions for how to administer the Rufinamide. He is starting a day program next week and the program would need a copy.          Please call caretaker Marilyn [phone] with any questions.

## 2024-08-01 RX ORDER — RUFINAMIDE 400 MG/1
TABLET, FILM COATED ORAL
Qty: 450 TABLET | Refills: 11 | OUTPATIENT
Start: 2024-08-01

## 2024-08-01 NOTE — TELEPHONE ENCOUNTER
Call returned to Marilyn. Patient is starting a day program next week. They need instructions for administration of medication Rufinamide as this will need to be given at day program.     Med order printed    Faxed to

## 2024-08-15 ENCOUNTER — OFFICE VISIT (OUTPATIENT)
Dept: NEUROLOGY | Facility: CLINIC | Age: 49
End: 2024-08-15
Payer: MEDICAID

## 2024-08-15 VITALS
BODY MASS INDEX: 24.34 KG/M2 | HEIGHT: 68 IN | WEIGHT: 160.6 LBS | DIASTOLIC BLOOD PRESSURE: 76 MMHG | OXYGEN SATURATION: 100 % | SYSTOLIC BLOOD PRESSURE: 117 MMHG | TEMPERATURE: 97.5 F | HEART RATE: 81 BPM

## 2024-08-15 DIAGNOSIS — G40.909 RECURRENT SEIZURES (H): ICD-10-CM

## 2024-08-15 DIAGNOSIS — G40.309 GENERALIZED TONIC CLONIC EPILEPSY (H): ICD-10-CM

## 2024-08-15 RX ORDER — RUFINAMIDE 400 MG/1
TABLET, FILM COATED ORAL
Qty: 450 TABLET | Refills: 11 | Status: SHIPPED | OUTPATIENT
Start: 2024-08-15

## 2024-08-15 RX ORDER — TOPIRAMATE 100 MG/1
100 TABLET, FILM COATED ORAL 2 TIMES DAILY
Qty: 62 TABLET | Refills: 11 | Status: SHIPPED | OUTPATIENT
Start: 2024-08-15

## 2024-08-15 RX ORDER — CANNABIDIOL 100 MG/ML
SOLUTION ORAL
Qty: 220 ML | Refills: 11 | Status: SHIPPED | OUTPATIENT
Start: 2024-08-15

## 2024-08-15 RX ORDER — LAMOTRIGINE 100 MG/1
TABLET ORAL
Qty: 150 TABLET | Refills: 11 | Status: SHIPPED | OUTPATIENT
Start: 2024-08-15

## 2024-08-15 NOTE — LETTER
8/15/2024       RE: Sharona Man  : 1975   MRN: [id number]      Dear Colleague,    Thank you for referring your patient, Sharona Man, to the [facility] at [facility]. Please see a copy of my visit note below.    CHIEF COMPLAINT:  Follow up for epilepsy.    HISTORY OF PRESENT ILLNESS:  In person follow up. This patient is a 48-year-old, right-handed male with history of intractable epilepsy and history of developmental delay.  He is accompanied by his group home staff Sandy during the visit. I also spoke with his  Marilyn over the phone.    Since the last visit about 6 months ago, he had no seizures.  He started on Epidiolex 5 mg/kg bid, 364 mg bid since . No side effects were reported. He had no seizures since Epidiolex started.  He is currently taking Rufinamide 2000 mg tid, Topamax 100 mg bid, Lamictal 200-300, Epidionex 364 mg bid. His group home staff was very pleased with the results.  They have no concerns, no issues regarding Salo.     He was previously seen at the [facility] many years ago.  For the past 6 years, he was seen at [place].  One of the goals of the group home staff today is to see if the patient can start on Epidiolex to control his seizures better.  The patient's guardian is Nadine Del Valle; her telephone number is [phone].  The patient also has a psychiatrist, Ivelisse Arauz, at [phone].    The patient started to have seizures probably in early childhood.  His seizures have been poorly controlled for many years.  At the present time, he continues to have frequent seizures. According to the group home staff, he is now still having one drop seizure about once every 6 weeks.  The patient moved to the new group home about 6 months ago.    The patient is currently on Banzel 2000 mg t.i.d., Lamictal 200 mg in the morning and 300 mg in the evening and  "Topamax 100 mg twice daily.    The patient has 2 types of seizures. Type #1 is described as \"drop seizures.\"  These are most likely atonic seizures. He will fall on the ground and shake. This will last for less than 2 minutes.  These are happening about once every 6 weeks at the present time.    Type #2 is probably generalized tonic-clonic seizures. These are infrequent.  The exact frequency is unclear at this time.  The group home staff thought they did not observe any for the past 6 months.    TRIGGERS FOR SEIZURES:  Unclear.    RISK FACTORS FOR SEIZURES: History of developmental delay.  No history of head trauma with loss of consciousness or febrile convulsions.      ALLERGIES:  No known drug allergies.    PAST MEDICAL HISTORY:  Symptomatic generalized epilepsy, developmental delay, obstructive sleep apnea.    PAST SURGICAL HISTORY:  None.    FAMILY HISTORY:  No family history of seizures.    SOCIAL HISTORY:  He lives in a group home.  No smoking, no alcohol, no drug abuse.  His guardian is Nadine Del Valle.    REVIEW OF SYSTEMS:  Unable to obtain.    PHYSICAL EXAMINATION:    Height 5' 7.5\" (171.5 cm).    General exam: General Appearance: No acute distress. HEENT: Normocephalic, atraumatic. Neck: Supple.  Extremities: No edema.     Neurologic Exam: Alert and oriented to name only. Very few words, follow commands. Cranial Nerves: Pupils are equal, round, reactive to light and accomodation. Extraocular movement intact. No facial weakness or asymmetry. Hearing normal. Motor Exam: Normal. Coordination:no ataxia.  Gait and Station: normal.      IMPRESSION:    1.  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  The patient had frequent drop attacks about once every 4-6 weeks in the past that sometimes cause injury in spite of the fact that he was taking 3 antiseizure medications with high doses. He had no drop attacks after he started on Epidiolex in 2021.    Since the last visit about 6 months " ago, he had no seizures.  He started on Epidiolex 5 mg/kg bid, 364 mg bid since 2021. No side effects were reported. He had no seizures since Epidiolex started.  He is currently taking Rufinamide 2000 mg tid, Topamax 100 mg bid, Lamictal 200-300, Epidionex 364 mg bid. His group home staff was very pleased with the results.  They have no concerns, no issues regarding Salo.     It seems that Epidiolex works extremely well for Salo.    2.  Developmental delay.    PLAN:    1.  Continue Lamictal 200-300, rufinamide 2000 mg t.i.d. and Topamax 100 mg b.i.d.   2.  Continue Epidiolex 5 mg/kg per day b.i.d. (3.64 ml, or 364 mg bid)  3.  We will discuss other options, such as VNS and corpus callosotomy, in the future with his guardian, Nadine Del Valle if seizures remains intractable and if he continues to have frequent drop attacks.  4.  Return to clinic in 6 months.      The longitudinal plan of care for seizures was addressed during this visit. Due to the added complexity in care, I will continue to support this patient in the subsequent management of this condition and with the ongoing continuity of care of this condition.       32 min total time was spent on the day of this visit.      22 min was spent on face to face time  10 min was spent on preparation of visit to review charts and labs, ordering medications and tests, and documentation of clinical information      Again, thank you for allowing me to participate in the care of your patient.      Sincerely,    Juan Luis Cruz MD

## 2024-08-15 NOTE — PROGRESS NOTES
"CHIEF COMPLAINT:  Follow up for epilepsy.    HISTORY OF PRESENT ILLNESS:  In person follow up. This patient is a 48-year-old, right-handed male with history of intractable epilepsy and history of developmental delay.  He is accompanied by his group home staff Sandy during the visit. I also spoke with his  Marilyn over the phone.    Since the last visit about 6 months ago, he had no seizures.  He started on Epidiolex 5 mg/kg bid, 364 mg bid since 2021. No side effects were reported. He had no seizures since Epidiolex started.  He is currently taking Rufinamide 2000 mg tid, Topamax 100 mg bid, Lamictal 200-300, Epidionex 364 mg bid. His group home staff was very pleased with the results.  They have no concerns, no issues regarding Salo.     He was previously seen at the [facility] many years ago.  For the past 6 years, he was seen at [place].  One of the goals of the group home staff today is to see if the patient can start on Epidiolex to control his seizures better.  The patient's guardian is Nadine Del Valle; her telephone number is [phone].  The patient also has a psychiatrist, Ivelisse Arauz, at [phone].    The patient started to have seizures probably in early childhood.  His seizures have been poorly controlled for many years.  At the present time, he continues to have frequent seizures. According to the group home staff, he is now still having one drop seizure about once every 6 weeks.  The patient moved to the new group home about 6 months ago.    The patient is currently on Banzel 2000 mg t.i.d., Lamictal 200 mg in the morning and 300 mg in the evening and Topamax 100 mg twice daily.    The patient has 2 types of seizures. Type #1 is described as \"drop seizures.\"  These are most likely atonic seizures. He will fall on the ground and shake. This will last for less than 2 minutes.  These are happening about once every 6 weeks at the present time.    Type #2 is probably " "generalized tonic-clonic seizures. These are infrequent.  The exact frequency is unclear at this time.  The group home staff thought they did not observe any for the past 6 months.    TRIGGERS FOR SEIZURES:  Unclear.    RISK FACTORS FOR SEIZURES: History of developmental delay.  No history of head trauma with loss of consciousness or febrile convulsions.      ALLERGIES:  No known drug allergies.    PAST MEDICAL HISTORY:  Symptomatic generalized epilepsy, developmental delay, obstructive sleep apnea.    PAST SURGICAL HISTORY:  None.    FAMILY HISTORY:  No family history of seizures.    SOCIAL HISTORY:  He lives in a group home.  No smoking, no alcohol, no drug abuse.  His guardian is Nadine Del Valle.    REVIEW OF SYSTEMS:  Unable to obtain.    PHYSICAL EXAMINATION:    Height 5' 7.5\" (171.5 cm).    General exam: General Appearance: No acute distress. HEENT: Normocephalic, atraumatic. Neck: Supple.  Extremities: No edema.     Neurologic Exam: Alert and oriented to name only. Very few words, follow commands. Cranial Nerves: Pupils are equal, round, reactive to light and accomodation. Extraocular movement intact. No facial weakness or asymmetry. Hearing normal. Motor Exam: Normal. Coordination:no ataxia.  Gait and Station: normal.      IMPRESSION:    1.  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  The patient had frequent drop attacks about once every 4-6 weeks in the past that sometimes cause injury in spite of the fact that he was taking 3 antiseizure medications with high doses. He had no drop attacks after he started on Epidiolex in 2021.    Since the last visit about 6 months ago, he had no seizures.  He started on Epidiolex 5 mg/kg bid, 364 mg bid since 2021. No side effects were reported. He had no seizures since Epidiolex started.  He is currently taking Rufinamide 2000 mg tid, Topamax 100 mg bid, Lamictal 200-300, Epidionex 364 mg bid. His group home staff was very pleased with the " results.  They have no concerns, no issues regarding Salo.     It seems that Epidiolex works extremely well for Salo.    2.  Developmental delay.    PLAN:    1.  Continue Lamictal 200-300, rufinamide 2000 mg t.i.d. and Topamax 100 mg b.i.d.   2.  Continue Epidiolex 5 mg/kg per day b.i.d. (3.64 ml, or 364 mg bid)  3.  We will discuss other options, such as VNS and corpus callosotomy, in the future with his guardian, Nadine Del Valle if seizures remains intractable and if he continues to have frequent drop attacks.  4.  Return to clinic in 6 months.      The longitudinal plan of care for seizures was addressed during this visit. Due to the added complexity in care, I will continue to support this patient in the subsequent management of this condition and with the ongoing continuity of care of this condition.       32 min total time was spent on the day of this visit.      22 min was spent on face to face time  10 min was spent on preparation of visit to review charts and labs, ordering medications and tests, and documentation of clinical information

## 2024-08-15 NOTE — PATIENT INSTRUCTIONS
PLAN:    1.  Continue Lamictal 200-300, rufinamide 2000 mg t.i.d. and Topamax 100 mg b.i.d.   2.  Continue Epidiolex 5 mg/kg per day b.i.d. (3.64 ml, or 364 mg bid)  3.  We will discuss other options, such as VNS and corpus callosotomy, in the future with his guardian, Nadine Del Valle if seizures remains intractable and if he continues to have frequent drop attacks.  4.  Return to clinic in 6 months.